# Patient Record
Sex: FEMALE | Race: WHITE | NOT HISPANIC OR LATINO | Employment: FULL TIME | URBAN - METROPOLITAN AREA
[De-identification: names, ages, dates, MRNs, and addresses within clinical notes are randomized per-mention and may not be internally consistent; named-entity substitution may affect disease eponyms.]

---

## 2020-12-15 PROCEDURE — U0004 COV-19 TEST NON-CDC HGH THRU: HCPCS | Performed by: PHYSICIAN ASSISTANT

## 2021-07-16 ENCOUNTER — HOSPITAL ENCOUNTER (OUTPATIENT)
Dept: GENERAL RADIOLOGY | Facility: HOSPITAL | Age: 41
Discharge: HOME | End: 2021-07-16
Admitting: STUDENT IN AN ORGANIZED HEALTH CARE EDUCATION/TRAINING PROGRAM

## 2021-07-16 ENCOUNTER — TRANSCRIBE ORDERS (OUTPATIENT)
Dept: ADMINISTRATIVE | Facility: HOSPITAL | Age: 41
End: 2021-07-16

## 2021-07-16 DIAGNOSIS — M54.2 NECK PAIN ON LEFT SIDE: ICD-10-CM

## 2021-07-16 DIAGNOSIS — S86.912D KNEE STRAIN, LEFT, SUBSEQUENT ENCOUNTER: Primary | ICD-10-CM

## 2021-07-16 PROCEDURE — 72052 X-RAY EXAM NECK SPINE 6/>VWS: CPT

## 2021-07-16 PROCEDURE — 73560 X-RAY EXAM OF KNEE 1 OR 2: CPT

## 2021-10-06 ENCOUNTER — TRANSCRIBE ORDERS (OUTPATIENT)
Dept: ADMINISTRATIVE | Facility: HOSPITAL | Age: 41
End: 2021-10-06

## 2021-10-06 DIAGNOSIS — M25.562 CHRONIC PAIN OF LEFT KNEE: Primary | ICD-10-CM

## 2021-10-06 DIAGNOSIS — G89.29 CHRONIC PAIN OF LEFT KNEE: Primary | ICD-10-CM

## 2021-11-05 ENCOUNTER — HOSPITAL ENCOUNTER (OUTPATIENT)
Dept: MRI IMAGING | Facility: HOSPITAL | Age: 41
Discharge: HOME | End: 2021-11-05
Admitting: STUDENT IN AN ORGANIZED HEALTH CARE EDUCATION/TRAINING PROGRAM

## 2021-11-05 DIAGNOSIS — G89.29 CHRONIC PAIN OF LEFT KNEE: ICD-10-CM

## 2021-11-05 DIAGNOSIS — M25.562 CHRONIC PAIN OF LEFT KNEE: ICD-10-CM

## 2021-11-05 PROCEDURE — 73721 MRI JNT OF LWR EXTRE W/O DYE: CPT

## 2021-11-24 ENCOUNTER — OFFICE VISIT (OUTPATIENT)
Dept: ORTHOPEDIC SURGERY | Facility: CLINIC | Age: 41
End: 2021-11-24

## 2021-11-24 VITALS
SYSTOLIC BLOOD PRESSURE: 129 MMHG | WEIGHT: 248.2 LBS | BODY MASS INDEX: 35.53 KG/M2 | HEIGHT: 70 IN | DIASTOLIC BLOOD PRESSURE: 82 MMHG

## 2021-11-24 DIAGNOSIS — G89.29 CHRONIC PAIN OF LEFT KNEE: ICD-10-CM

## 2021-11-24 DIAGNOSIS — M94.262 CHONDROMALACIA OF LEFT KNEE: Primary | ICD-10-CM

## 2021-11-24 DIAGNOSIS — M25.562 CHRONIC PAIN OF LEFT KNEE: ICD-10-CM

## 2021-11-24 PROCEDURE — 99204 OFFICE O/P NEW MOD 45 MIN: CPT | Performed by: ORTHOPAEDIC SURGERY

## 2021-11-24 PROCEDURE — 20610 DRAIN/INJ JOINT/BURSA W/O US: CPT | Performed by: ORTHOPAEDIC SURGERY

## 2021-11-24 RX ORDER — ROPIVACAINE HYDROCHLORIDE 5 MG/ML
4 INJECTION, SOLUTION EPIDURAL; INFILTRATION; PERINEURAL
Status: COMPLETED | OUTPATIENT
Start: 2021-11-24 | End: 2021-11-24

## 2021-11-24 RX ORDER — OXCARBAZEPINE 150 MG/1
TABLET, FILM COATED ORAL
COMMUNITY
Start: 2021-11-04 | End: 2022-04-01

## 2021-11-24 RX ORDER — MULTIPLE VITAMINS W/ MINERALS TAB 9MG-400MCG
TAB ORAL
COMMUNITY

## 2021-11-24 RX ORDER — TRIAMCINOLONE ACETONIDE 40 MG/ML
40 INJECTION, SUSPENSION INTRA-ARTICULAR; INTRAMUSCULAR
Status: COMPLETED | OUTPATIENT
Start: 2021-11-24 | End: 2021-11-24

## 2021-11-24 RX ORDER — BACLOFEN 10 MG/1
10 TABLET ORAL NIGHTLY PRN
COMMUNITY

## 2021-11-24 RX ORDER — PREGABALIN 200 MG/1
200 CAPSULE ORAL 3 TIMES DAILY
COMMUNITY
Start: 2021-11-17 | End: 2022-09-06 | Stop reason: SDUPTHER

## 2021-11-24 RX ORDER — ACETAMINOPHEN 500 MG
TABLET ORAL
COMMUNITY
End: 2022-04-01

## 2021-11-24 RX ORDER — AZELASTINE 1 MG/ML
2 SPRAY, METERED NASAL
COMMUNITY
End: 2022-08-12

## 2021-11-24 RX ADMIN — ROPIVACAINE HYDROCHLORIDE 4 ML: 5 INJECTION, SOLUTION EPIDURAL; INFILTRATION; PERINEURAL at 11:02

## 2021-11-24 RX ADMIN — TRIAMCINOLONE ACETONIDE 40 MG: 40 INJECTION, SUSPENSION INTRA-ARTICULAR; INTRAMUSCULAR at 11:02

## 2021-11-24 NOTE — PROGRESS NOTES
[facility] [place] Note    Subjective     Chief Complaint   Patient presents with   • Left Knee - Pain        HPI    Bev Ferrari is a 41 y.o. female who presents with new problem of: left knee pain.  Onset: atraumatic and gradual in nature. The issue has been ongoing for 4 year(s). Pain is a 6/10 on the pain scale. Pain is described as dull, aching, burning, throbbing, stabbing and shooting. Associated symptoms include pain. The pain is worse with walking, standing, sitting, climbing stairs, working and leisure; ice and heat improve the pain. Previous treatments have included: NSAIDS and physical therapy.  No previous injections.  No improvement with physical therapy.  Pain is on the medial aspect of the knee.  No history of trauma.    I have reviewed the following portions of the patient's history and agree with: History of Present Illness and Review of Systems    There is no problem list on file for this patient.    Past Medical History:   Diagnosis Date   • HPV in female       Past Surgical History:   Procedure Laterality Date   • DENTAL PROCEDURE        Family History   Problem Relation Age of Onset   • Cancer Mother    • Diabetes Father    • Hypertension Father    • Cancer Father      Social History     Socioeconomic History   • Marital status: Single   Tobacco Use   • Smoking status: Never Smoker   • Smokeless tobacco: Never Used   Vaping Use   • Vaping Use: Never used   Substance and Sexual Activity   • Alcohol use: Yes   • Drug use: Never   • Sexual activity: Yes      Current Outpatient Medications on File Prior to Visit   Medication Sig Dispense Refill   • acetaminophen (TYLENOL) 500 MG tablet Tylenol Extra Strength 500 mg tablet     • azelastine (ASTELIN) 0.1 % nasal spray azelastine 137 mcg (0.1 %) nasal spray aerosol     • baclofen (LIORESAL) 10 MG tablet baclofen 10 mg tablet     • loratadine (CLARITIN) 10 MG tablet Take 10 mg by mouth Daily.     • multivitamin with minerals  "(Multivitamin Adult) tablet tablet      • OXcarbazepine (TRILEPTAL) 150 MG tablet Take  by mouth.     • pregabalin (LYRICA) 200 MG capsule        No current facility-administered medications on file prior to visit.      Allergies   Allergen Reactions   • Azithromycin Itching   • Molds & Smuts Other (See Comments)   • Pollen Extract Other (See Comments)   • Vicodin [Hydrocodone-Acetaminophen] Itching        Review of Systems   Constitutional: Negative.    HENT: Negative.    Eyes: Negative.    Respiratory: Negative.    Cardiovascular: Negative.    Gastrointestinal: Negative.    Endocrine: Negative.    Genitourinary: Negative.    Musculoskeletal: Positive for arthralgias.   Skin: Negative.    Allergic/Immunologic: Negative.    Neurological: Negative.    Hematological: Negative.    Psychiatric/Behavioral: Negative.         Objective      Physical Exam  /82   Ht 177.8 cm (70\")   Wt 113 kg (248 lb 3.2 oz)   BMI 35.61 kg/m²     Body mass index is 35.61 kg/m².    General:   Mental Status:  Alert   Appearance: Cooperative, in no acute distress   Build and Nutrition: Obese by BMI female   Orientation: Alert and oriented to person, place and time   Posture: Normal   Gait: Nonantalgic    Integument:   Left knee: No skin lesions, no rash, no ecchymosis    Neurologic:   Sensation:    Left foot: Intact to light touch on the dorsal and plantar aspect   Motor:  Left lower extremity: 5/5 quadriceps, hamstrings, ankle dorsiflexors, and ankle plantar flexors  Vascular:   Left lower extremity: 2+ dorsalis pedis pulse, prompt capillary refill    Lower Extremities:   Left Knee:    Tenderness:  Mild medial joint line tenderness    Effusion:  None    Swelling:  None    Atrophy:  None    Range of motion:  Extension: 0°       Flexion: 140°  Instability:  No varus laxity, no valgus laxity, negative anterior drawer  Deformities:  None      Imaging/Studies    EXAMINATION: XR STANDING LEFT KNEE  - 07/16/2021     INDICATION: " S86.912D-Strain of unspecified muscle(s) and tendon(s) at  lower leg level, left leg, subsequent encounter.      COMPARISON: None.     FINDINGS: AP and lateral views of the left knee standing reveal mild  degenerative changes and mild joint space narrowing in the medial  compartment without acute fracture or irregularity with tibial plateau  preserved. Fabella noted. Benign-appearing chondroid lesion within the  distal femur. No effusion.     IMPRESSION:  No acute osseous findings with mild degenerative changes and  mild medial joint space narrowing without effusion.     DICTATED:   07/16/2021  EDITED/ls :   07/16/2021     This report was finalized on 7/16/2021 5:17 PM by Dr. Luca Goyal.           EXAMINATION: MRI KNEE LEFT  WO CONTRAST-      INDICATION: CHRONIC PAIN OF LEFT KNEE; M25.562-Pain in left knee;  G89.29-Other chronic pain     TECHNIQUE: MRI of the left knee was obtained using standard imaging  sequences in three orthogonal imaging planes. No intravenous or  intra-articular contrast was administered.     COMPARISON: Left knee radiographs 7/16/2021     FINDINGS:      A 2.3 x 1.4 cm intramedullary ovoid T1 hypointense, T2 hyperintense  lesion with stippled central hypointensities of the distal femoral shaft  corresponds to radiographic finding of ring and arc calcifications,  compatible with low-grade chondroid lesion, likely enchondroma. No  radiographic or MR evidence of endosteal scalloping or other aggressive  features. Otherwise the bone marrow signal intensity is within normal  limits without evidence of discrete lesion, fracture, or marrow edema.     The medial meniscus is normal. The medial supporting structures are  normal. The articular cartilage of the medial femorotibial compartment  is grossly preserved.     The lateral meniscus is normal. The lateral supporting structures are  normal. The articular cartilage of the lateral femorotibial compartment  is grossly preserved.     The anterior  cruciate ligament and posterior cruciate ligament are  normal.     The quadriceps tendon and patellar tendon are normal. High-grade near  full-thickness chondral fissure of the median patellar ridge (series 4  image 7). The trochlear cartilage is grossly preserved.     There is a physiologic amount of joint fluid. The posterior knee  neurovasculature is unremarkable. No popliteal cyst. No subcutaneous  edema. Normal morphology and signal intensity of the musculature.        IMPRESSION:     High-grade near full-thickness chondral fissure of the medial aspect of  the median patellar ridge.     Incidental note of intramedullary low-grade chondral lesion in the  distal femoral shaft, likely enchondroma. No endosteal scalloping or  other aggressive features.     This report was finalized on 11/5/2021 4:10 PM by Augusto Montoya MD.     I reviewed the MRI report and imaging and agree with the findings.  Also reviewed the outside radiographic imaging, and agree with the findings.    Assessment and Plan     Diagnoses and all orders for this visit:    1. Chondromalacia of left knee (Primary)    2. Chronic pain of left knee  -     Large Joint Arthrocentesis: L knee  -     ropivacaine (NAROPIN) 0.5 % injection 4 mL  -     triamcinolone acetonide (KENALOG-40) injection 40 mg        1. Chondromalacia of left knee    2. Chronic pain of left knee        I reviewed my findings with the patient today.  MRI shows a chondral fissure, and patient has had ongoing symptoms with no improvement with physical therapy.  I offered her a trial of an intra-articular injection, which should be done in a limited fashion, and she wishes to proceed.  I will see her back in 2 months, but sooner for any problems.  I do not believe that surgical intervention would be particularly helpful at this time.    Procedure Note:  The potential benefits of performing a therapeutic left knee joint injection, as well as potential risks (including, but not limited  to infection, swelling, pain, bleeding, bruising, nerve/blood vessel damage, skin color changes, transient elevation in blood glucose levels, and fat atrophy) were discussed with the patient.  After informed consent, timeout procedure was performed, and the skin on the left knee was prepped with chlorhexidine soap and alcohol, after which ethyl chloride was applied to the skin at the injection site. Via the anterolateral approach, 1ml of Kenalog 40mg/ml mixed with 4ml 0.5% ropivacaine plain was injected into the knee joint.  The patient tolerated the procedure well, experiencing 25% improvement a few minutes following the injection. There were no complications.  Band-Aid was applied to the injection site. Post-procedural instructions were given to the patient and/or their caregiver.      Return in about 2 months (around 1/24/2022).      Miguel Werner MD  11/24/21  11:28 EST

## 2021-11-24 NOTE — PROGRESS NOTES
Procedure   Large Joint Arthrocentesis: L knee  Date/Time: 11/24/2021 11:02 AM  Consent given by: patient  Site marked: site marked  Timeout: Immediately prior to procedure a time out was called to verify the correct patient, procedure, equipment, support staff and site/side marked as required   Supporting Documentation  Indications: pain   Procedure Details  Location: knee - L knee  Preparation: Patient was prepped and draped in the usual sterile fashion  Needle size: 22 G  Approach: anterolateral  Medications administered: 4 mL ropivacaine 0.5 %; 40 mg triamcinolone acetonide 40 MG/ML  Patient tolerance: patient tolerated the procedure well with no immediate complications

## 2022-02-28 ENCOUNTER — OFFICE VISIT (OUTPATIENT)
Dept: ORTHOPEDIC SURGERY | Facility: CLINIC | Age: 42
End: 2022-02-28

## 2022-02-28 VITALS
DIASTOLIC BLOOD PRESSURE: 88 MMHG | WEIGHT: 246 LBS | HEIGHT: 70 IN | BODY MASS INDEX: 35.22 KG/M2 | SYSTOLIC BLOOD PRESSURE: 132 MMHG

## 2022-02-28 DIAGNOSIS — G89.29 CHRONIC PAIN OF LEFT KNEE: Primary | ICD-10-CM

## 2022-02-28 DIAGNOSIS — M25.562 CHRONIC PAIN OF LEFT KNEE: Primary | ICD-10-CM

## 2022-02-28 PROCEDURE — 99213 OFFICE O/P EST LOW 20 MIN: CPT | Performed by: ORTHOPAEDIC SURGERY

## 2022-02-28 RX ORDER — OXYMETAZOLINE HYDROCHLORIDE 0.05 G/100ML
SPRAY NASAL
COMMUNITY
End: 2022-04-01

## 2022-02-28 RX ORDER — TRAMADOL HYDROCHLORIDE 50 MG/1
TABLET ORAL
COMMUNITY
Start: 2022-01-25 | End: 2022-04-01

## 2022-02-28 NOTE — PROGRESS NOTES
[facility] [place] [facility] Note    Subjective     Chief Complaint   Patient presents with   • Follow-up     3 month f/u Chondromalacia of left knee; cortisone injection 11.24.2021        HPI    It has been 3  month(s) since Ms. Ferrari's last visit. She returns to clinic today for follow-up of left knee pain. The issue has been ongoing for 4 year(s). She rates her pain a 7/10 on the pain scale. Previous/current treatments: NSAIDS and physical therapy. Current symptoms: pain, grinding and stiffness. The pain is worse with walking, standing, sitting, climbing stairs, sleeping, working, leisure and rising from seated position; resting, ice and heat improve the pain. Overall, she is doing the same. Pain is on the medial aspect of the knee. Pain to palpation on the medial aspect of her knee. No significant improvement with the injection on her last visit. She does have a known history of back issues.    I have reviewed the following portions of the patient's history and agree with: History of Present Illness and Review of Systems    There is no problem list on file for this patient.    Past Medical History:   Diagnosis Date   • HPV in female       Past Surgical History:   Procedure Laterality Date   • DENTAL PROCEDURE        Family History   Problem Relation Age of Onset   • Cancer Mother    • Diabetes Father    • Hypertension Father    • Cancer Father      Social History     Socioeconomic History   • Marital status: Single   Tobacco Use   • Smoking status: Never Smoker   • Smokeless tobacco: Never Used   Vaping Use   • Vaping Use: Never used   Substance and Sexual Activity   • Alcohol use: Yes   • Drug use: Never   • Sexual activity: Yes      Current Outpatient Medications on File Prior to Visit   Medication Sig Dispense Refill   • acetaminophen (TYLENOL) 500 MG tablet Tylenol Extra Strength 500 mg tablet     • azelastine (ASTELIN) 0.1 % nasal spray azelastine 137 mcg (0.1 %) nasal spray aerosol     • baclofen  (LIORESAL) 10 MG tablet baclofen 10 mg tablet     • loratadine (CLARITIN) 10 MG tablet Take 10 mg by mouth Daily.     • multivitamin with minerals (Multivitamin Adult) tablet tablet      • Oxymetazoline HCl (Nasal Spray) 0.05 % solution oxymetazoline 0.05 % nasal spray   use 1-2 sprays in each nostril as directed     • pregabalin (LYRICA) 200 MG capsule      • traMADol (ULTRAM) 50 MG tablet      • OXcarbazepine (TRILEPTAL) 150 MG tablet Take  by mouth.       No current facility-administered medications on file prior to visit.      Allergies   Allergen Reactions   • Azithromycin Itching   • Molds & Smuts Other (See Comments)   • Pollen Extract Other (See Comments)   • Vicodin [Hydrocodone-Acetaminophen] Itching        Review of Systems   Constitutional: Positive for unexpected weight change. Negative for activity change, appetite change, chills, diaphoresis, fatigue and fever.   HENT: Positive for congestion, dental problem, ear pain, hearing loss, sinus pressure and tinnitus. Negative for drooling, ear discharge, facial swelling, mouth sores, nosebleeds, postnasal drip, rhinorrhea, sneezing, sore throat, trouble swallowing and voice change.    Eyes: Negative.  Negative for photophobia, pain, discharge, redness, itching and visual disturbance.   Respiratory: Negative.  Negative for apnea, cough, choking, chest tightness, shortness of breath, wheezing and stridor.    Cardiovascular: Negative.  Negative for chest pain, palpitations and leg swelling.   Gastrointestinal: Positive for abdominal pain and constipation. Negative for abdominal distention, anal bleeding, blood in stool, diarrhea, nausea, rectal pain and vomiting.   Endocrine: Negative.  Negative for cold intolerance, heat intolerance, polydipsia, polyphagia and polyuria.   Genitourinary: Positive for difficulty urinating. Negative for decreased urine volume, dysuria, enuresis, flank pain, frequency, genital sores, hematuria and urgency.   Musculoskeletal:  "Positive for arthralgias, back pain, neck pain and neck stiffness. Negative for gait problem, joint swelling and myalgias.   Skin: Negative.  Negative for color change, pallor, rash and wound.   Allergic/Immunologic: Positive for environmental allergies. Negative for food allergies and immunocompromised state.   Neurological: Positive for headaches. Negative for dizziness, tremors, seizures, syncope, facial asymmetry, speech difficulty, weakness, light-headedness and numbness.   Hematological: Negative.  Negative for adenopathy. Does not bruise/bleed easily.   Psychiatric/Behavioral: Negative for agitation, behavioral problems, confusion, decreased concentration, dysphoric mood, hallucinations, self-injury, sleep disturbance and suicidal ideas. The patient is nervous/anxious. The patient is not hyperactive.         Objective      Physical Exam  /88   Ht 177.8 cm (70\")   Wt 112 kg (246 lb)   BMI 35.30 kg/m²     Body mass index is 35.3 kg/m².    General:   Mental Status:  Alert   Appearance: Cooperative, in no acute distress   Build and Nutrition: Obese by BMI female   Orientation: Alert and oriented to person, place and time   Posture: Normal   Gait: Nonantalgic    Integument:              Left knee: No skin lesions, no rash, no ecchymosis     Lower Extremities:              Left Knee:                          Tenderness:    Mild medial joint line tenderness                          Effusion:          None                          Swelling:          None                          Atrophy:           None                          Range of motion:        Extension:       0°                                                              Flexion:           140°  Instability:        No varus laxity, no valgus laxity, negative anterior drawer  Deformities:     None    Imaging/Studies  Imaging Results (Last 24 Hours)     ** No results found for the last 24 hours. **        No new imaging    Assessment and Plan "     Diagnoses and all orders for this visit:    1. Chronic pain of left knee (Primary)        1. Chronic pain of left knee        I reviewed my findings again with the patient today. Previous studies were reviewed again today, and there is no clear etiology for her medial pain. She had no significant relief with the intra-articular injection on her last visit. I recommended a trial of diclofenac gel for the next 2 to 3 weeks in hopes of providing some relief from the medial pain. If there is no relief, she may want to seek a second opinion and we can assist with that referral if she desires. In summary, she has had no improvement with oral anti-inflammatories, physical therapy, activity modifications, and an intra-articular knee injection. Patient was agreeable to this plan today. I'll be happy to see her back if she has any worsening symptoms or changes, or persistence after the diclofenac gel trial.    Return if symptoms worsen or fail to improve.      Miguel Werner MD  02/28/22  17:27 EST

## 2022-02-28 NOTE — PROGRESS NOTES
"    [facility] [place] [facility]    Subjective     Chief Complaint   Patient presents with   • Follow-up     3 month f/u Chondromalacia of left knee; cortisone injection 11.24.2021        HPI    It has been {Numbers; 0-30:96864}  {DAYS, WEEKS, MONTHS, YEARS:22024} since Ms. Ferrari's last visit. She returns to clinic today for {Jamir new/established:80244} {Right/left:46374} {Jamir body part:98502} {Jamir Reason:11523}. The issue has been ongoing for {Numbers; 0-30:36212} {DAYS, WEEKS, MONTHS, YEARS:64608}. She rates her pain a {0-10:18129}/10 on the pain scale. Previous/current treatments: {Previous Tx:36037}. Current symptoms: {Jamir Symptoms:42264}. The pain is worse with {Movement:04445}; {Home Tx:28868} improve the pain. Overall, she is doing {better worse same:01181}. ***     I have reviewed the following portions of the patient's history and agree with: {Alphonso HPI and ROS:62715::\"History of Present Illness\",\"Review of Systems\"}    There is no problem list on file for this patient.    Past Medical History:   Diagnosis Date   • HPV in female       Past Surgical History:   Procedure Laterality Date   • DENTAL PROCEDURE        Family History   Problem Relation Age of Onset   • Cancer Mother    • Diabetes Father    • Hypertension Father    • Cancer Father      Social History     Socioeconomic History   • Marital status: Single   Tobacco Use   • Smoking status: Never Smoker   • Smokeless tobacco: Never Used   Vaping Use   • Vaping Use: Never used   Substance and Sexual Activity   • Alcohol use: Yes   • Drug use: Never   • Sexual activity: Yes      Current Outpatient Medications on File Prior to Visit   Medication Sig Dispense Refill   • acetaminophen (TYLENOL) 500 MG tablet Tylenol Extra Strength 500 mg tablet     • azelastine (ASTELIN) 0.1 % nasal spray azelastine 137 mcg (0.1 %) nasal spray aerosol     • baclofen (LIORESAL) 10 MG tablet baclofen 10 mg tablet     • loratadine (CLARITIN) 10 MG tablet " Take 10 mg by mouth Daily.     • multivitamin with minerals (Multivitamin Adult) tablet tablet      • OXcarbazepine (TRILEPTAL) 150 MG tablet Take  by mouth.     • pregabalin (LYRICA) 200 MG capsule        No current facility-administered medications on file prior to visit.      Allergies   Allergen Reactions   • Azithromycin Itching   • Molds & Smuts Other (See Comments)   • Pollen Extract Other (See Comments)   • Vicodin [Hydrocodone-Acetaminophen] Itching        Review of Systems   Constitutional: Negative for activity change, appetite change, chills, diaphoresis, fatigue, fever and unexpected weight change.   HENT: Negative for congestion, dental problem, drooling, ear discharge, ear pain, facial swelling, hearing loss, mouth sores, nosebleeds, postnasal drip, rhinorrhea, sinus pressure, sneezing, sore throat, tinnitus, trouble swallowing and voice change.    Eyes: Negative for photophobia, pain, discharge, redness, itching and visual disturbance.   Respiratory: Negative for apnea, cough, choking, chest tightness, shortness of breath, wheezing and stridor.    Cardiovascular: Negative for chest pain, palpitations and leg swelling.   Gastrointestinal: Negative for abdominal distention, abdominal pain, anal bleeding, blood in stool, constipation, diarrhea, nausea, rectal pain and vomiting.   Endocrine: Negative for cold intolerance, heat intolerance, polydipsia, polyphagia and polyuria.   Genitourinary: Negative for decreased urine volume, difficulty urinating, dysuria, enuresis, flank pain, frequency, genital sores, hematuria and urgency.   Musculoskeletal: Positive for arthralgias. Negative for back pain, gait problem, joint swelling, myalgias, neck pain and neck stiffness.   Skin: Negative for color change, pallor, rash and wound.   Allergic/Immunologic: Negative for environmental allergies, food allergies and immunocompromised state.   Neurological: Negative for dizziness, tremors, seizures, syncope, facial  "asymmetry, speech difficulty, weakness, light-headedness, numbness and headaches.   Hematological: Negative for adenopathy. Does not bruise/bleed easily.   Psychiatric/Behavioral: Negative for agitation, behavioral problems, confusion, decreased concentration, dysphoric mood, hallucinations, self-injury, sleep disturbance and suicidal ideas. The patient is not nervous/anxious and is not hyperactive.         Objective      Physical Exam  Ht 177.8 cm (70\")   BMI 35.61 kg/m²     Body mass index is 35.61 kg/m².    General:   Mental Status:  Alert   Appearance: Cooperative, in no acute distress   Build and Nutrition: *** female   Orientation: Alert and oriented to person, place and time   Posture: Normal   Gait: ***    ***    Imaging/Studies  Imaging Results (Last 24 Hours)     ** No results found for the last 24 hours. **            Assessment and Plan     There are no diagnoses linked to this encounter.    No diagnosis found.    ***    No follow-ups on file.      Miguel Werner MD  02/28/22  16:22 EST    "

## 2022-04-01 ENCOUNTER — OFFICE VISIT (OUTPATIENT)
Dept: NEUROLOGY | Facility: CLINIC | Age: 42
End: 2022-04-01

## 2022-04-01 VITALS
HEIGHT: 70 IN | HEART RATE: 88 BPM | SYSTOLIC BLOOD PRESSURE: 110 MMHG | TEMPERATURE: 97.3 F | OXYGEN SATURATION: 97 % | WEIGHT: 252.6 LBS | DIASTOLIC BLOOD PRESSURE: 88 MMHG | RESPIRATION RATE: 14 BRPM | BODY MASS INDEX: 36.16 KG/M2

## 2022-04-01 DIAGNOSIS — G50.0 TRIGEMINAL NEURALGIA: Primary | ICD-10-CM

## 2022-04-01 DIAGNOSIS — M35.07 SJOGREN'S SYNDROME WITH CENTRAL NERVOUS SYSTEM INVOLVEMENT: ICD-10-CM

## 2022-04-01 PROCEDURE — 99204 OFFICE O/P NEW MOD 45 MIN: CPT | Performed by: PSYCHIATRY & NEUROLOGY

## 2022-04-01 RX ORDER — OMEPRAZOLE 20 MG/1
20 CAPSULE, DELAYED RELEASE ORAL DAILY
COMMUNITY
End: 2022-08-12

## 2022-04-01 NOTE — PROGRESS NOTES
Chief Complaint    Establish Care (Trigeminal neuralgia ) and Trigeminal Neuralgia    Subjective          Bev Ferrari presents to [facility] NEUROLOGY     History of Present Illness    41 y.o. female referred by Dr Lan Obregon for trigeminal neuralgia.    Pain started over left ear last March 2021.  Constant.  Intensity 6/10.      Progressed to sharp electrical shocks into face.  Touch and cold provoked shocks.   Tx with muscle relaxers and steroids w/o benefit.     Left face sensitive to touch.  Cold and weather changes increases discomfort.      Lyrica decreases pain by a moderate amount.      OXC reduced pain but had increased bruising.     Started on right side but is minor.      Reviewed medical records:    Sx of left facial pain and weakness.  Sx onset months ago.  Sx occur weekly.  Concurrent Sjogren's.    Recent onset of right sided facial pain.      Meds:  Tramadol, Lyrica, baclofen, TPM, OXC, CBZ, TCAD     Labs - 1/4/22 CBC, CMP - NCS     HCT - normal     MRI Brain - NCS    Past Medical History:   Diagnosis Date   • Anxiety    • Arthritis    • Bowel trouble    • Chronic mental illness    • Depression    • H/O seasonal allergies    • Headache    • Hearing loss    • HPV in female    • Sjogren's syndrome (HCC)    • Trigeminal neuralgia       Family History   Problem Relation Age of Onset   • Dementia Mother    • Cancer Mother    • Skin cancer Mother    • Bipolar disorder Mother    • Diabetes Father    • Hypertension Father    • Cancer Father    • Prostate cancer Father    • Heart disease Father    • High cholesterol Father    • Obesity Father    • Depression Sister    • Multiple sclerosis Sister    • Epilepsy Brother    • Breast cancer Maternal Grandmother    • Alcohol abuse Maternal Grandfather       Social History     Socioeconomic History   • Marital status: Single   Tobacco Use   • Smoking status: Never Smoker   • Smokeless tobacco: Never Used   Vaping Use   • Vaping Use: Never  "used   Substance and Sexual Activity   • Alcohol use: Yes   • Drug use: Never   • Sexual activity: Yes    Review of Systems   Constitutional: Negative for activity change, fatigue and unexpected weight change.   HENT: Negative for tinnitus and trouble swallowing.    Eyes: Negative for photophobia and visual disturbance.   Respiratory: Negative for apnea, cough and choking.    Cardiovascular: Negative for leg swelling.   Gastrointestinal: Negative for nausea and vomiting.   Endocrine: Negative for cold intolerance and heat intolerance.   Genitourinary: Negative for difficulty urinating, frequency, menstrual problem and urgency.   Musculoskeletal: Positive for arthralgias. Negative for back pain, gait problem, myalgias and neck pain.   Skin: Negative for color change and rash.   Allergic/Immunologic: Negative for immunocompromised state.   Neurological: Positive for headaches. Negative for dizziness, tremors, seizures, syncope, facial asymmetry, speech difficulty, weakness, light-headedness and numbness.   Hematological: Negative for adenopathy. Does not bruise/bleed easily.   Psychiatric/Behavioral: Negative for behavioral problems, confusion, decreased concentration, hallucinations and sleep disturbance. The patient is nervous/anxious.          Objective   Vital Signs:   /88 (BP Location: Right arm, Patient Position: Sitting, Cuff Size: Adult)   Pulse 88   Temp 97.3 °F (36.3 °C) (Infrared)   Resp 14   Ht 177.8 cm (70\")   Wt 115 kg (252 lb 9.6 oz)   SpO2 97%   BMI 36.24 kg/m²            Physical Exam  Eyes:      Extraocular Movements: EOM normal.      Pupils: Pupils are equal, round, and reactive to light.   Neurological:      Mental Status: She is oriented to person, place, and time.      Gait: Gait is intact.      Deep Tendon Reflexes: Strength normal.   Psychiatric:         Speech: Speech normal.          Neurologic Exam     Mental Status   Oriented to person, place, and time.   Speech: speech is " normal   Level of consciousness: alert  Knowledge: good and consistent with education.   Normal comprehension.     Cranial Nerves   Cranial nerves II through XII intact.     CN II   Visual fields full to confrontation.   Visual acuity: normal  Right visual field deficit: none  Left visual field deficit: none     CN III, IV, VI   Pupils are equal, round, and reactive to light.  Extraocular motions are normal.   Nystagmus: none   Diplopia: none  Ophthalmoparesis: none  Upgaze: normal  Downgaze: normal  Conjugate gaze: present    CN V   Facial sensation intact.   Right corneal reflex: normal  Left corneal reflex: normal    CN VII   Right facial weakness: none  Left facial weakness: none    CN VIII   Hearing: intact    CN IX, X   Palate: symmetric  Right gag reflex: normal  Left gag reflex: normal    CN XI   Right sternocleidomastoid strength: normal  Left sternocleidomastoid strength: normal    CN XII   Tongue: not atrophic  Fasciculations: absent  Tongue deviation: none  Increased sensitivity L V2      Motor Exam   Muscle bulk: normal  Overall muscle tone: normal    Strength   Strength 5/5 throughout.     Sensory Exam   Light touch normal.     Gait, Coordination, and Reflexes     Gait  Gait: normal    Tremor   Resting tremor: absent  Intention tremor: absent  Action tremor: absent    Reflexes   Reflexes 2+ except as noted.      Result Review :     Common labs    Common Labsle 1/4/22 1/4/22    1340 1340   Glucose 97    BUN 15    Creatinine 0.83    eGFR Non  Am >60    eGFR African Am >60    Sodium 142    Potassium 4.4    Chloride 105    Calcium 9.8    Albumin 4.4    Total Bilirubin 0.3    Alkaline Phosphatase 89    AST (SGOT) 15    ALT (SGPT) 17    WBC  10.63 (A)   Hemoglobin  14.0   Hematocrit  44.1   Platelets  330   (A) Abnormal value       Comments are available for some flowsheets but are not being displayed.           Data reviewed: Consultant notes Dr Kitchen, [facility]          Assessment and Plan     Diagnoses and all orders for this visit:    1. Trigeminal neuralgia (Primary)  Assessment & Plan:  Continue Lyrica     Recommend restarting OXC       2. Sjogren's syndrome with central nervous system involvement (HCC)      Follow Up   Return in about 8 weeks (around 5/27/2022).  Patient was given instructions and counseling regarding her condition or for health maintenance advice. Please see specific information pulled into the AVS if appropriate.

## 2022-04-14 VITALS
RESPIRATION RATE: 18 BRPM | BODY MASS INDEX: 36.08 KG/M2 | SYSTOLIC BLOOD PRESSURE: 158 MMHG | WEIGHT: 252 LBS | OXYGEN SATURATION: 99 % | TEMPERATURE: 97.8 F | HEART RATE: 95 BPM | DIASTOLIC BLOOD PRESSURE: 83 MMHG | HEIGHT: 70 IN

## 2022-04-14 PROCEDURE — 99282 EMERGENCY DEPT VISIT SF MDM: CPT

## 2022-04-15 ENCOUNTER — APPOINTMENT (OUTPATIENT)
Dept: GENERAL RADIOLOGY | Facility: HOSPITAL | Age: 42
End: 2022-04-15

## 2022-04-15 ENCOUNTER — APPOINTMENT (OUTPATIENT)
Dept: CT IMAGING | Facility: HOSPITAL | Age: 42
End: 2022-04-15

## 2022-04-15 ENCOUNTER — HOSPITAL ENCOUNTER (EMERGENCY)
Facility: HOSPITAL | Age: 42
Discharge: HOME | End: 2022-04-15
Attending: EMERGENCY MEDICINE | Admitting: EMERGENCY MEDICINE

## 2022-04-15 DIAGNOSIS — K22.89 ESOPHAGEAL PAIN: Primary | ICD-10-CM

## 2022-04-15 PROCEDURE — 71045 X-RAY EXAM CHEST 1 VIEW: CPT

## 2022-04-15 PROCEDURE — 70360 X-RAY EXAM OF NECK: CPT

## 2022-04-15 PROCEDURE — 70490 CT SOFT TISSUE NECK W/O DYE: CPT

## 2022-04-15 RX ORDER — OXCARBAZEPINE 150 MG/1
150 TABLET, FILM COATED ORAL 2 TIMES DAILY
COMMUNITY
End: 2022-04-29 | Stop reason: SDUPTHER

## 2022-04-20 ENCOUNTER — TELEPHONE (OUTPATIENT)
Dept: NEUROLOGY | Facility: CLINIC | Age: 42
End: 2022-04-20

## 2022-04-20 NOTE — TELEPHONE ENCOUNTER
Notified Bev of 's recommendation:   Adjust  mg qam 150 mg qpm and 450 gm qhs     She states understanding and will see how she tolerates this, keep us updated if she has any issues. Thanks!

## 2022-04-29 RX ORDER — OXCARBAZEPINE 150 MG/1
150 TABLET, FILM COATED ORAL 2 TIMES DAILY
Qty: 60 TABLET | Refills: 2 | Status: SHIPPED | OUTPATIENT
Start: 2022-04-29 | End: 2022-05-02 | Stop reason: SDUPTHER

## 2022-05-02 ENCOUNTER — TELEPHONE (OUTPATIENT)
Dept: NEUROLOGY | Facility: CLINIC | Age: 42
End: 2022-05-02

## 2022-05-02 DIAGNOSIS — G50.0 TRIGEMINAL NEURALGIA: Primary | ICD-10-CM

## 2022-05-02 RX ORDER — OXCARBAZEPINE 150 MG/1
TABLET, FILM COATED ORAL
Qty: 210 TABLET | Refills: 2 | Status: SHIPPED | OUTPATIENT
Start: 2022-05-02 | End: 2022-08-05 | Stop reason: SDUPTHER

## 2022-05-02 NOTE — TELEPHONE ENCOUNTER
Called pharmacy to make sure increase will go through. Pharmacist states as of May 6th it will so she can finish off the 150's she picked up Friday go ahead and do the increase. Thanks!      Notified patient who was already aware of this and the increase thankfully so has been taking them this way and will  the correct amount that has been sent in this Friday.

## 2022-05-02 NOTE — TELEPHONE ENCOUNTER
PT CALLED FOR RX , WE CALLED IN WRONG DOSAGE CALLED OFFICE TALK TO BETH , TRANS CALL REQUIRES HER ATTENTION

## 2022-05-02 NOTE — TELEPHONE ENCOUNTER
Bev called as she states the office sent in the same amount on Friday when she needed the increase. Per Jose's 4/20/22 telephone encounter:  Adjust  mg qam 150 mg qpm and 450 gm qhs     Sent this in to Machelle and notified can call pharmacy and see if a PA is needed I will do an urgent one. THanks!

## 2022-06-03 ENCOUNTER — HOSPITAL ENCOUNTER (EMERGENCY)
Facility: HOSPITAL | Age: 42
Discharge: HOME | End: 2022-06-03
Attending: EMERGENCY MEDICINE | Admitting: EMERGENCY MEDICINE

## 2022-06-03 VITALS
HEIGHT: 70 IN | OXYGEN SATURATION: 96 % | SYSTOLIC BLOOD PRESSURE: 150 MMHG | WEIGHT: 255 LBS | RESPIRATION RATE: 18 BRPM | HEART RATE: 97 BPM | BODY MASS INDEX: 36.51 KG/M2 | TEMPERATURE: 98.2 F | DIASTOLIC BLOOD PRESSURE: 97 MMHG

## 2022-06-03 DIAGNOSIS — T78.2XXA ANAPHYLAXIS, INITIAL ENCOUNTER: ICD-10-CM

## 2022-06-03 DIAGNOSIS — T78.40XA ALLERGIC REACTION, INITIAL ENCOUNTER: Primary | ICD-10-CM

## 2022-06-03 PROCEDURE — 63710000001 PREDNISONE PER 1 MG: Performed by: NURSE PRACTITIONER

## 2022-06-03 PROCEDURE — 99283 EMERGENCY DEPT VISIT LOW MDM: CPT

## 2022-06-03 RX ORDER — DULOXETIN HYDROCHLORIDE 60 MG/1
150 CAPSULE, DELAYED RELEASE ORAL DAILY
COMMUNITY
End: 2022-08-12 | Stop reason: SINTOL

## 2022-06-03 RX ORDER — FAMOTIDINE 20 MG/1
20 TABLET, FILM COATED ORAL 2 TIMES DAILY
Qty: 20 TABLET | Refills: 0 | Status: SHIPPED | OUTPATIENT
Start: 2022-06-03 | End: 2022-08-12

## 2022-06-03 RX ORDER — PREDNISONE 20 MG/1
60 TABLET ORAL DAILY
Qty: 9 TABLET | Refills: 0 | Status: SHIPPED | OUTPATIENT
Start: 2022-06-03 | End: 2022-08-12

## 2022-06-03 RX ORDER — DIPHENHYDRAMINE HYDROCHLORIDE 50 MG/ML
25 INJECTION INTRAMUSCULAR; INTRAVENOUS ONCE
Status: DISCONTINUED | OUTPATIENT
Start: 2022-06-03 | End: 2022-06-03

## 2022-06-03 RX ORDER — DIPHENHYDRAMINE HCL 25 MG
25 TABLET ORAL EVERY 6 HOURS PRN
Qty: 12 TABLET | Refills: 0 | Status: SHIPPED | OUTPATIENT
Start: 2022-06-03 | End: 2022-08-12

## 2022-06-03 RX ORDER — EPINEPHRINE 0.3 MG/.3ML
0.3 INJECTION SUBCUTANEOUS ONCE
Qty: 3 EACH | Refills: 2 | Status: SHIPPED | OUTPATIENT
Start: 2022-06-03 | End: 2022-06-03

## 2022-06-03 RX ORDER — FAMOTIDINE 20 MG/1
20 TABLET, FILM COATED ORAL ONCE
Status: COMPLETED | OUTPATIENT
Start: 2022-06-03 | End: 2022-06-03

## 2022-06-03 RX ORDER — PREDNISONE 20 MG/1
60 TABLET ORAL ONCE
Status: COMPLETED | OUTPATIENT
Start: 2022-06-03 | End: 2022-06-03

## 2022-06-03 RX ADMIN — PREDNISONE 60 MG: 20 TABLET ORAL at 11:46

## 2022-06-03 RX ADMIN — FAMOTIDINE 20 MG: 20 TABLET ORAL at 11:47

## 2022-06-03 NOTE — ED PROVIDER NOTES
Subjective   Pleasant patient presents the ER with allergic reaction after getting allergy shots.  Patient tells me she had used her EpiPen which completely resolved her symptoms.  She tells me she had a allergy shot today around 830.  She was watched for about an hour and she went back home.  She started to develop a lump in her throat with difficulty breathing.  She called the paramedics and also used her EpiPen which helped her symptoms substantially and was recommended that she come to the ER for further evaluation.  As of right now she feels just fine.  She tells me her symptoms are gone.  There is no chest pain or difficulty breathing.      Allergic Reaction  Presenting symptoms: difficulty breathing, difficulty swallowing and swelling    Presenting symptoms: no wheezing    Severity:  Severe  Duration:  1 hour  Prior allergic episodes:  No prior episodes  Relieved by:  Epinephrine  Worsened by:  Nothing      Review of Systems   Constitutional: Negative for chills, diaphoresis and fever.   HENT: Positive for trouble swallowing. Negative for congestion and sore throat.    Respiratory: Negative for cough, choking, chest tightness, shortness of breath and wheezing.    Cardiovascular: Negative for chest pain and leg swelling.   Gastrointestinal: Negative for abdominal distention, abdominal pain, anal bleeding, blood in stool, constipation, diarrhea, nausea and vomiting.   Genitourinary: Negative for difficulty urinating, dysuria, flank pain, frequency, hematuria and urgency.   All other systems reviewed and are negative.      Past Medical History:   Diagnosis Date   • Anxiety    • Arthritis    • Bowel trouble    • Chronic mental illness    • Depression    • H/O seasonal allergies    • Headache    • Hearing loss    • HPV in female    • Sjogren's syndrome (HCC)    • Trigeminal neuralgia        Allergies   Allergen Reactions   • Azithromycin Itching   • Molds & Smuts Other (See Comments)   • Pollen Extract Other (See  Comments)   • Terbinafine Unknown - Low Severity   • Vicodin [Hydrocodone-Acetaminophen] Itching   • Hydroxychloroquine Rash       Past Surgical History:   Procedure Laterality Date   • CERVICAL BIOPSY     • D & C FIRST TRIMESTER / TX INCOMPLETE / MISSED / SEPTIC / INDUCED   2000   • DENTAL PROCEDURE     • LEEP     • ROOT CANAL         Family History   Problem Relation Age of Onset   • Dementia Mother    • Cancer Mother    • Skin cancer Mother    • Bipolar disorder Mother    • Diabetes Father    • Hypertension Father    • Cancer Father    • Prostate cancer Father    • Heart disease Father    • High cholesterol Father    • Obesity Father    • Depression Sister    • Multiple sclerosis Sister    • Epilepsy Brother    • Breast cancer Maternal Grandmother    • Alcohol abuse Maternal Grandfather        Social History     Socioeconomic History   • Marital status: Single   Tobacco Use   • Smoking status: Never Smoker   • Smokeless tobacco: Never Used   Vaping Use   • Vaping Use: Never used   Substance and Sexual Activity   • Alcohol use: Yes   • Drug use: Never   • Sexual activity: Yes           Objective   Physical Exam  Constitutional:       Appearance: She is well-developed.   HENT:      Head: Normocephalic and atraumatic.      Right Ear: External ear normal.      Left Ear: External ear normal.      Nose: Nose normal.   Eyes:      Conjunctiva/sclera: Conjunctivae normal.      Pupils: Pupils are equal, round, and reactive to light.   Cardiovascular:      Rate and Rhythm: Normal rate and regular rhythm.      Heart sounds: Normal heart sounds.   Pulmonary:      Effort: Pulmonary effort is normal.      Breath sounds: Normal breath sounds.   Abdominal:      General: Bowel sounds are normal.      Palpations: Abdomen is soft.   Musculoskeletal:         General: Normal range of motion.      Cervical back: Normal range of motion and neck supple.   Skin:     General: Skin is warm and dry.   Neurological:      Mental  Status: She is alert and oriented to person, place, and time.   Psychiatric:         Behavior: Behavior normal.         Thought Content: Thought content normal.         Judgment: Judgment normal.         Procedures           ED Course  ED Course as of 06/03/22 1233   Fri Jun 03, 2022   1142 Pleasant patient.  I had a very long sitdown conversation with her and we discussed the presentation as well as the signs symptoms worse condition.  She is asymptomatic.  I will prescribe her more EpiPen's as well as steroids and antihistamines.  Mandatory follow-up with her allergist this week to discuss continuation of her allergy injections.  Did advise her to use the EpiPen earlier on in her symptoms prior to it increasing to a substantial point.  We will observe her in the ER for a little longer and then plan on discharging. [JM]      ED Course User Index  [JM] Zack Arnold APRN                                                 [facility]    Final diagnoses:   Allergic reaction, initial encounter   Anaphylaxis, initial encounter       ED Disposition  ED Disposition     ED Disposition   Discharge    Condition   Stable    Comment   --             Lan Obregon MD  [address] [address]  [phone]    Schedule an appointment as soon as possible for a visit       See your allergist this week for further evaluation of your reaction as well as discussion with continuation of your allergy shots    Schedule an appointment as soon as possible for a visit       [facility] Emergency Department  [address] [address] [phone]    If symptoms worsen         Medication List      New Prescriptions    diphenhydrAMINE 25 MG tablet  Commonly known as: BENADRYL  Take 1 tablet by mouth Every 6 (Six) Hours As Needed for Itching.     famotidine 20 MG tablet  Commonly known as: PEPCID  Take 1 tablet by mouth 2 (Two) Times a Day.     predniSONE 20 MG tablet  Commonly known  as: DELTASONE  Take 3 tablets by mouth Daily. Starting tomorrow        Changed    * EPINEPHrine 0.1 MG/0.1ML solution auto-injector  What changed: Another medication with the same name was added. Make sure you understand how and when to take each.     * EPINEPHrine 0.3 MG/0.3ML solution auto-injector injection  Commonly known as: EPIPEN  Inject 0.3 mL under the skin into the appropriate area as directed 1 (One) Time for 1 dose.  What changed: You were already taking a medication with the same name, and this prescription was added. Make sure you understand how and when to take each.         * This list has 2 medication(s) that are the same as other medications prescribed for you. Read the directions carefully, and ask your doctor or other care provider to review them with you.               Where to Get Your Medications      These medications were sent to [facility] [address] [place] [address] [address] [facility] AT [facility] [phone] [facility] [phone] [address] [address]    Phone: [phone]   · diphenhydrAMINE 25 MG tablet  · EPINEPHrine 0.3 MG/0.3ML solution auto-injector injection  · famotidine 20 MG tablet  · predniSONE 20 MG tablet          Zack Arnold APRN  06/03/22 2504

## 2022-08-05 DIAGNOSIS — G50.0 TRIGEMINAL NEURALGIA: ICD-10-CM

## 2022-08-05 RX ORDER — OXCARBAZEPINE 150 MG/1
TABLET, FILM COATED ORAL
Qty: 210 TABLET | Refills: 2 | Status: SHIPPED | OUTPATIENT
Start: 2022-08-05 | End: 2022-08-12 | Stop reason: SDUPTHER

## 2022-08-05 NOTE — TELEPHONE ENCOUNTER
Rx Refill Note  Requested Prescriptions     Pending Prescriptions Disp Refills   • OXcarbazepine (TRILEPTAL) 150 MG tablet 210 tablet 2     Sig: Take 450mg qAM, 150mg in the afternoon and 450mg qPM      Last filled: 5/2/22 with 2 refills  Sent this in.   Last office visit with prescribing clinician: 4/1/2022      Next office visit with prescribing clinician: 8/12/2022     Sierra Kessler MA  08/05/22, 09:13 EDT

## 2022-08-05 NOTE — TELEPHONE ENCOUNTER
Caller: Bev Vega  Relationship: SELF  Best call back number: [phone]    Requested Prescriptions: OXcarbazepine (TRILEPTAL) 150 MG tablet  Requested Prescriptions      No prescriptions requested or ordered in this encounter        Pharmacy where request should be sent:  [facility]    Additional details provided by patient: PT WILL BE OUT OF MEDICATION OXcarbazepine (TRILEPTAL) 150 MG tablet IN A FEW DAYS    Does the patient have less than a 3 day supply:  [x] Yes  [] No    Jose Daniel Garsia Rep   08/05/22 08:39 EDT

## 2022-08-12 ENCOUNTER — OFFICE VISIT (OUTPATIENT)
Dept: NEUROLOGY | Facility: CLINIC | Age: 42
End: 2022-08-12

## 2022-08-12 VITALS
WEIGHT: 256 LBS | BODY MASS INDEX: 36.65 KG/M2 | HEART RATE: 102 BPM | SYSTOLIC BLOOD PRESSURE: 118 MMHG | DIASTOLIC BLOOD PRESSURE: 74 MMHG | HEIGHT: 70 IN | TEMPERATURE: 96.6 F | OXYGEN SATURATION: 97 %

## 2022-08-12 DIAGNOSIS — M35.07 SJOGREN'S SYNDROME WITH CENTRAL NERVOUS SYSTEM INVOLVEMENT: Chronic | ICD-10-CM

## 2022-08-12 DIAGNOSIS — G43.C0 PERIODIC HEADACHE SYNDROME, NOT INTRACTABLE: ICD-10-CM

## 2022-08-12 DIAGNOSIS — G50.0 TRIGEMINAL NEURALGIA: Primary | Chronic | ICD-10-CM

## 2022-08-12 PROCEDURE — 99214 OFFICE O/P EST MOD 30 MIN: CPT | Performed by: PSYCHIATRY & NEUROLOGY

## 2022-08-12 RX ORDER — TRAMADOL HYDROCHLORIDE 50 MG/1
50 TABLET ORAL NIGHTLY PRN
COMMUNITY
Start: 2022-08-01

## 2022-08-12 RX ORDER — AMOXICILLIN AND CLAVULANATE POTASSIUM 500; 125 MG/1; MG/1
1 TABLET, FILM COATED ORAL
COMMUNITY
Start: 2022-08-01 | End: 2023-01-04

## 2022-08-12 RX ORDER — OXCARBAZEPINE 300 MG/1
900 TABLET, FILM COATED ORAL 2 TIMES DAILY
Qty: 540 TABLET | Refills: 3 | Status: SHIPPED | OUTPATIENT
Start: 2022-08-12 | End: 2022-12-09 | Stop reason: SDUPTHER

## 2022-08-12 NOTE — PROGRESS NOTES
"Chief Complaint  Trigeminal Neuralgia    Subjective        Bev Ferrari presents to [facility] NEUROLOGY     History of Present Illness    42 y.o. female returns in follow up.  Last visit on 4/4/22 continued Lyrica, restarted OXC.       mg qam, 150 mg qpm, 450 mg qhs    Break through pain in afternoon.  Worsens with weather changes.      Dull ache on left side of head.  Does not like hair touching left side of face.   Switches sides.  Mild to moderate intensity.      Problem history:    Pain started over left ear last March 2021.  Constant.  Intensity 6/10.       Progressed to sharp electrical shocks into face.  Touch and cold provoked shocks.   Tx with muscle relaxers and steroids w/o benefit.      Left face sensitive to touch.  Cold and weather changes increases discomfort.       Lyrica decreases pain by a moderate amount.       OXC reduced pain but had increased bruising.      Started on right side but is minor.       Reviewed medical records:     Sx of left facial pain and weakness.  Sx onset months ago.  Sx occur weekly.  Concurrent Sjogren's.     Recent onset of right sided facial pain.       Meds:  Tramadol, Lyrica, baclofen, TPM, OXC, CBZ, TCAD      Labs - 1/4/22 CBC, CMP - NCS      HCT - normal     MRI Brain - NCS     Objective   Vital Signs:  /74   Pulse 102   Temp 96.6 °F (35.9 °C)   Ht 177.8 cm (70\")   Wt 116 kg (256 lb)   SpO2 97%   BMI 36.73 kg/m²   Estimated body mass index is 36.73 kg/m² as calculated from the following:    Height as of this encounter: 177.8 cm (70\").    Weight as of this encounter: 116 kg (256 lb).          Physical Exam  Eyes:      Extraocular Movements: EOM normal.      Pupils: Pupils are equal, round, and reactive to light.   Neurological:      Mental Status: She is oriented to person, place, and time.      Gait: Gait is intact.      Deep Tendon Reflexes: Strength normal.   Psychiatric:         Speech: Speech normal.          Neurologic Exam "     Mental Status   Oriented to person, place, and time.   Speech: speech is normal   Level of consciousness: alert  Knowledge: good and consistent with education.   Normal comprehension.     Cranial Nerves   Cranial nerves II through XII intact.     CN II   Visual fields full to confrontation.   Visual acuity: normal  Right visual field deficit: none  Left visual field deficit: none     CN III, IV, VI   Pupils are equal, round, and reactive to light.  Extraocular motions are normal.   Nystagmus: none   Diplopia: none  Ophthalmoparesis: none  Upgaze: normal  Downgaze: normal  Conjugate gaze: present    CN V   Facial sensation intact.   Right corneal reflex: normal  Left corneal reflex: normal    CN VII   Right facial weakness: none  Left facial weakness: none    CN VIII   Hearing: intact    CN IX, X   Palate: symmetric  Right gag reflex: normal  Left gag reflex: normal    CN XI   Right sternocleidomastoid strength: normal  Left sternocleidomastoid strength: normal    CN XII   Tongue: not atrophic  Fasciculations: absent  Tongue deviation: none  Increased sensitivity L V2      Motor Exam   Muscle bulk: normal  Overall muscle tone: normal    Strength   Strength 5/5 throughout.     Sensory Exam   Light touch normal.     Gait, Coordination, and Reflexes     Gait  Gait: normal    Tremor   Resting tremor: absent  Intention tremor: absent  Action tremor: absent    Reflexes   Reflexes 2+ except as noted.      Result Review :  The following data was reviewed by: Buck Garcia MD on 08/12/2022:  Common labs    Common Labsle 1/4/22 1/4/22 6/8/22 6/8/22    1340 1340 1203 1203   Glucose 97  87    BUN 15  19    Creatinine 0.83  0.85    eGFR Non African Am >60  >60    eGFR African Am >60  >60    Sodium 142  137    Potassium 4.4  3.8    Chloride 105  98    Calcium 9.8  9.0    Albumin 4.4  4.1    Total Bilirubin 0.3  0.3    Alkaline Phosphatase 89  86    AST (SGOT) 15  31    ALT (SGPT) 17  42 (A)    WBC  10.63 (A)  14.51 (A)    Hemoglobin  14.0  14.5   Hematocrit  44.1  44.4   Platelets  330  331   (A) Abnormal value       Comments are available for some flowsheets but are not being displayed.                     Assessment and Plan   Diagnoses and all orders for this visit:    1. Trigeminal neuralgia (Primary)  Assessment & Plan:  Continue OXC and Lyrica     Orders:  -     OXcarbazepine (TRILEPTAL) 300 MG tablet; Take 3 tablets by mouth 2 (Two) Times a Day.  Dispense: 540 tablet; Refill: 3    2. Sjogren's syndrome with central nervous system involvement (HCC)    3. Periodic headache syndrome, not intractable  Assessment & Plan:  Headaches are worsening.  Medication changes per orders.     New Ajovy start                  Follow Up   No follow-ups on file.  Patient was given instructions and counseling regarding her condition or for health maintenance advice. Please see specific information pulled into the AVS if appropriate.

## 2022-08-15 ENCOUNTER — TELEPHONE (OUTPATIENT)
Dept: NEUROLOGY | Facility: CLINIC | Age: 42
End: 2022-08-15

## 2022-08-15 ENCOUNTER — TELEPHONE (OUTPATIENT)
Dept: NEUROLOGY | Facility: OTHER | Age: 42
End: 2022-08-15

## 2022-08-15 NOTE — TELEPHONE ENCOUNTER
Caller: PATIENT     Relationship: SELF    Best call back number: [phone]    What medications are you currently taking:   Current Outpatient Medications on File Prior to Visit   Medication Sig Dispense Refill   • amoxicillin-clavulanate (AUGMENTIN) 500-125 MG per tablet Take 1 tablet by mouth.     • baclofen (LIORESAL) 10 MG tablet Take 10 mg by mouth At Night As Needed.     • EPINEPHrine 0.1 MG/0.1ML solution auto-injector Inject  as directed As Needed.     • loratadine (CLARITIN) 10 MG tablet Take 10 mg by mouth Daily.     • multivitamin with minerals tablet tablet      • OXcarbazepine (TRILEPTAL) 300 MG tablet Take 3 tablets by mouth 2 (Two) Times a Day. 540 tablet 3   • pregabalin (LYRICA) 200 MG capsule Take 200 mg by mouth 3 (Three) Times a Day.     • traMADol (ULTRAM) 50 MG tablet Take 50 mg by mouth At Night As Needed.       No current facility-administered medications on file prior to visit.        Which medication are you concerned about: OXCARBAZEPINE     Who prescribed you this medication: STU    What are your concerns: GOT A MSG FROM PHARMACY THAT THE RX WAS READY AND THE COST WAS $30.  IT IS USUALLY $10 SO SHE WAS WONDERING WHAT THE DIFFERENCE WAS DUE TO.  SHE WILL CALL THE PHARMACY TO CHECK WITH THEM AND IF THERE IS AN ISSUE THEY CAN'T ADDRESS SHE WILL CALL BACK.

## 2022-08-15 NOTE — TELEPHONE ENCOUNTER
Provider: STU   Caller: PATIENT  Relationship to Patient: SELF  Pharmacy: N/A  Phone Number: [phone]  Reason for Call: PATIENT CALLED TO UPDATE ORIGINAL MESSAGE BY ABRAM MEJIA (ROUTED TO WRONG LOCATION PER [facility])    ORIGINAL MESSAGE:GOT A MSG FROM PHARMACY THAT THE RX WAS READY AND THE COST WAS $30.  IT IS USUALLY $10 SO SHE WAS WONDERING WHAT THE DIFFERENCE WAS DUE TO.  SHE WILL CALL THE PHARMACY TO CHECK WITH THEM AND IF THERE IS AN ISSUE THEY CAN'T ADDRESS SHE WILL CALL BACK.    PATIENT CALLED BACK TO ADVISE THE REASON RX FOR OXCARBAZEPINE  COST MORE THAN THE USUAL AMOUNT IS BECAUSE RX WAS FILLED FOR 3 MONTHS VS 1 MONTH.    PLEASE CALL WITH QUESTIONS OR CONCERNS.    THANK YOU

## 2022-08-24 ENCOUNTER — TELEPHONE (OUTPATIENT)
Dept: NEUROLOGY | Facility: CLINIC | Age: 42
End: 2022-08-24

## 2022-08-24 NOTE — TELEPHONE ENCOUNTER
PATIENT CALLED TO FOLLOW UP ON ORIGINAL MESSAGE.  ADVISED DR. PERAZA INSTRUCTS TO GO BACK TO ORIGINAL DOSE AND SEE IF SYMPTONS IMPROVE.  PATIENT WILL FOLLOW THESE INSTRUCTIONS.

## 2022-08-24 NOTE — TELEPHONE ENCOUNTER
Notified patient who states understanding. She will keep us updated it's hard to tell what the cause is. States body is attacking herself so it's hard to tell.

## 2022-08-24 NOTE — TELEPHONE ENCOUNTER
Caller: Bev Ferrari    Relationship: Self    Best call back number: [phone]    Preferred pharmacy: [address] AT [facility] [phone] [facility] [phone] FX    What medications are you currently taking:   Current Outpatient Medications on File Prior to Visit   Medication Sig Dispense Refill   • amoxicillin-clavulanate (AUGMENTIN) 500-125 MG per tablet Take 1 tablet by mouth.     • baclofen (LIORESAL) 10 MG tablet Take 10 mg by mouth At Night As Needed.     • EPINEPHrine 0.1 MG/0.1ML solution auto-injector Inject  as directed As Needed.     • loratadine (CLARITIN) 10 MG tablet Take 10 mg by mouth Daily.     • multivitamin with minerals tablet tablet      • OXcarbazepine (TRILEPTAL) 300 MG tablet Take 3 tablets by mouth 2 (Two) Times a Day. 540 tablet 3   • pregabalin (LYRICA) 200 MG capsule Take 200 mg by mouth 3 (Three) Times a Day.     • traMADol (ULTRAM) 50 MG tablet Take 50 mg by mouth At Night As Needed.       No current facility-administered medications on file prior to visit.     Which medication are you concerned about: Oxcarbazepine (TRILEPTAL) 300 MG tablet- PT IS CURRENTLY TAKING 2 TABLETS IN THE MORNING, 1 TABLET IN THE AFTERNOON, AND 2 TABLETS IN THE EVENING.    Who prescribed you this medication: DR. PERAZA    When did you start taking these medications:  MEDICATION INCREASED ON 8/12/22    What are your concerns: PT REPORTS THAT SINCE MEDICATION CHANGE, SHE HAS STARTED TO EXPERIENCE FULL FACIAL NUMBNESS THAT SHE DESCRIBES AS PAINFUL, MIGRAINE-LIKE PAIN ON BOTH SIDES OF HER HEAD. PT ALSO EXPRESSES SHE HAS HAD BLURRED VISION SINCE INCREASING MEDICATION DOSAGE AS WELL.    *PT HAS JAW BONE GRAFT COMPLETED 3 WEEKS AGO WHICH SHE FEELS HAS CONTRIBUTED TO HER CONCERNS.    SPOKE W/ BETH, WHOM ADVISED SHE WOULD CALL PT BACK SHORTLY TO FURTHER TRIAGE SYMPTOMS.    PLEASE REVIEW AND ADVISE.

## 2022-08-29 ENCOUNTER — TRANSCRIBE ORDERS (OUTPATIENT)
Dept: ADMINISTRATIVE | Facility: HOSPITAL | Age: 42
End: 2022-08-29

## 2022-08-29 DIAGNOSIS — M79.662 PAIN IN LEFT LOWER LEG: Primary | ICD-10-CM

## 2022-08-30 ENCOUNTER — TELEPHONE (OUTPATIENT)
Dept: NEUROLOGY | Facility: CLINIC | Age: 42
End: 2022-08-30

## 2022-08-30 NOTE — TELEPHONE ENCOUNTER
Provider: STU    Caller: PATIENT  Relationship to Patient: SELF  Pharmacy: [facility]   Phone Number: [phone]  Reason for Call: PATIENT TELEPHONED RE: F/U WITH DR PERAZA REGARDING HER DENTIST DOES NOT SEE ANYTHING WRONG & IT MAY TAKE A WHILE FOR PAIN RECEPTORS TO GO AWAY. PATIENT WOULD LIKE TO INCREASE MEDS OXCARBAZEPINE (TRILEPTAL) 300 MG TABLET BACK TO NORMAL DOSAGE    PLEASE CALL & ADVISE

## 2022-08-30 NOTE — TELEPHONE ENCOUNTER
Yoel Pablo and notified  is fine with this. May have to Play around with the doses to see what she can tolerate and what dose works best for her. She is going to take baby steps with this. States she still has some of the 150's left so is doing some 450mg and has gone up to 600mg.  She is just used to providers giving exact instructions but will play around with the doses and keep us updated. Hopefully finds the combination that will work.     Went to her periodontist who states no infection after bone graft. States maybe still feeling pain because of those pain receptors and may take some time for this to go away. Offered her Tramadol but she doesn't want to have to take a med for pain just wants it to be gone in the 1st place.

## 2022-09-06 DIAGNOSIS — G50.0 TRIGEMINAL NEURALGIA: Primary | ICD-10-CM

## 2022-09-06 RX ORDER — PREGABALIN 200 MG/1
200 CAPSULE ORAL 3 TIMES DAILY
Qty: 270 CAPSULE | Refills: 1 | Status: SHIPPED | OUTPATIENT
Start: 2022-09-06 | End: 2022-12-09 | Stop reason: SDUPTHER

## 2022-09-06 NOTE — TELEPHONE ENCOUNTER
Caller: Vega Bev    Relationship: Self    Best call back number: [phone]    Requested Prescriptions:   Requested Prescriptions     Pending Prescriptions Disp Refills   • pregabalin (LYRICA) 200 MG capsule       Sig: Take 1 capsule by mouth 3 (Three) Times a Day.        Pharmacy where request should be sent:  [id number] [phone]    Additional details provided by patient: RX HAS  &  PATIENT IS TRAVELING OUT OF TOWN & NEEDS THIS RX REFILLED BY THE END OF TODAY    Does the patient have less than a 3 day supply:  [x] Yes  [] No    Jose Daniel Davis Rep   22 09:14 EDT

## 2022-09-06 NOTE — TELEPHONE ENCOUNTER
Rx Refill Note  Requested Prescriptions     Pending Prescriptions Disp Refills   • pregabalin (LYRICA) 200 MG capsule       Sig: Take 1 capsule by mouth 3 (Three) Times a Day.      Last filled: 11/17/2021   Last office visit with prescribing clinician: 8/12/2022      Next office visit with prescribing clinician: 12/9/2022     Leena Rey MA  09/06/22, 09:28 EDT

## 2022-09-07 ENCOUNTER — SPECIALTY PHARMACY (OUTPATIENT)
Dept: ONCOLOGY | Facility: HOSPITAL | Age: 42
End: 2022-09-07

## 2022-09-07 NOTE — PROGRESS NOTES
Specialty Pharmacy Patient Management Program  Neurology Initial Assessment     Bev Ferrari is a 42 y.o. female with migraines seen by a Neurology provider and enrolled in the Neurology Patient Management program offered by [facility].  An initial outreach was conducted, including assessment of therapy appropriateness and specialty medication education for Emgality.  Patient was given Ajovy in the office on 8/12/22, but insurance required Emgality.  Patient and provider notified and education completed.  The patient was introduced to services offered by [facility], including: regular assessments, refill coordination, curbside pick-up or mail order delivery options, prior authorization maintenance, and financial assistance programs as applicable. The patient was also provided with contact information for the pharmacy team.     Insurance Coverage & Financial Support  [facility], Emgality co-pay card.    Relevant Past Medical History and Comorbidities  Relevant medical history and concomitant health conditions were discussed with the patient. The patient's chart has been reviewed for relevant past medical history and comorbid health conditions and updated as necessary.   Past Medical History:   Diagnosis Date   • Anxiety    • Arthritis    • Bowel trouble    • Chronic mental illness    • Depression    • Fibromyalgia, primary    • H/O seasonal allergies    • Headache    • Headache, tension-type    • Hearing loss    • HPV in female    • Periodic headache syndrome, not intractable 8/12/2022   • Sjogren's syndrome (HCC)    • Trigeminal neuralgia    • Vision loss      Social History     Socioeconomic History   • Marital status: Single   Tobacco Use   • Smoking status: Never Smoker   • Smokeless tobacco: Never Used   • Tobacco comment: 2nd hand exposure to cigarettes and vaping.   Vaping Use   • Vaping Use: Never used   Substance and Sexual Activity   • Alcohol use: Not  Currently     Comment: Stopped all alcohol when started on [place] meds.   • Drug use: Never   • Sexual activity: Yes     Partners: Male     Birth control/protection: Other     Comment: Pull-out method.       Problem list reviewed by Dominga Wetzel PharmD on 9/7/2022 at 10:04 AM    Allergies  Known allergies and reactions were discussed with the patient. The patient's chart has been reviewed for  allergy information and updated as necessary.   Azithromycin, Molds & smuts, Pollen extract, Terbinafine, Hydrocodone-acetaminophen, and Hydroxychloroquine    Allergies reviewed by Dominga Wetzel PharmD on 9/7/2022 at 10:04 AM    Current Medication List  This medication list has been reviewed with the patient and evaluated for any interactions or necessary modifications/recommendations, and updated to include all prescription medications, OTC medications, and supplements the patient is currently taking.  This list reflects what is contained in the patient's profile, which has also been marked as reviewed to communicate to other providers it is the most up to date version of the patient's current medication therapy.     Current Outpatient Medications:   •  amoxicillin-clavulanate (AUGMENTIN) 500-125 MG per tablet, Take 1 tablet by mouth., Disp: , Rfl:   •  baclofen (LIORESAL) 10 MG tablet, Take 10 mg by mouth At Night As Needed., Disp: , Rfl:   •  EPINEPHrine 0.1 MG/0.1ML solution auto-injector, Inject  as directed As Needed., Disp: , Rfl:   •  galcanezumab-gnlm (EMGALITY) 120 MG/ML auto-injector pen, Inject 1 mL under the skin into the appropriate area as directed Every 28 (Twenty-Eight) Days., Disp: 1 mL, Rfl: 11  •  loratadine (CLARITIN) 10 MG tablet, Take 10 mg by mouth Daily., Disp: , Rfl:   •  multivitamin with minerals tablet tablet, , Disp: , Rfl:   •  OXcarbazepine (TRILEPTAL) 300 MG tablet, Take 3 tablets by mouth 2 (Two) Times a Day., Disp: 540 tablet, Rfl: 3  •  pregabalin (LYRICA) 200 MG capsule, Take 1 capsule  by mouth 3 (Three) Times a Day., Disp: 270 capsule, Rfl: 1  •  traMADol (ULTRAM) 50 MG tablet, Take 50 mg by mouth At Night As Needed., Disp: , Rfl:     Medicines reviewed by Dominga Wetzel, PharmD on 9/7/2022 at 10:04 AM    Drug Interactions  none     Recommended Medications Assessment    None      Relevant Laboratory Values    Common labs    Common Labsle 1/4/22 1/4/22 6/8/22 6/8/22    1340 1340 1203 1203   Glucose 97  87    BUN 15  19    Creatinine 0.83  0.85    eGFR Non African Am >60  >60    eGFR African Am >60  >60    Sodium 142  137    Potassium 4.4  3.8    Chloride 105  98    Calcium 9.8  9.0    Albumin 4.4  4.1    Total Bilirubin 0.3  0.3    Alkaline Phosphatase 89  86    AST (SGOT) 15  31    ALT (SGPT) 17  42 (A)    WBC  10.63 (A)  14.51 (A)   Hemoglobin  14.0  14.5   Hematocrit  44.1  44.4   Platelets  330  331   (A) Abnormal value       Comments are available for some flowsheets but are not being displayed.             Initial Education Provided for Specialty Medication  The patient has been provided with the following education and any applicable administration techniques (i.e. self-injection) have been demonstrated for the therapies indicated. All questions and concerns have been addressed prior to the patient receiving the medication, and the patient has verbalized understanding of the education and any materials provided.  Additional patient education shall be provided and documented upon request by the patient, provider or payer.                   Emgality (Galcanezumab-[facility])           Medication Expectations   Why am I taking this medication? You are taking this medication for migraine prophylaxis.   What should I expect while on this medication? You should expect to a decrease in the frequency and severity of your migraines.   How does the medication work? Emgality is a monoclonal antibody that binds to calcitonin gene-related peptide (CGRP) and blocks its binding to the receptor decreasing the  severity of migraines.   How long will I be on this medication for? The amount of time you will be on this medication will be determined by your doctor and your response to the medication.    How do I take this medication? Take as directed on your prescription label. This medication is a self-injection given every 28 days.    What are some possible side effects? Injection site reactions and hypersensitivity reactions.   What happens if I miss a dose? If you miss a dose, take it as soon as you remember, and time next dose 28 days from last dose.                  Medication Safety   What are things I should warn my doctor immediately about? Hypersensitivity reactions.   What are things that I should be cautious of? Injection site reaction.   What are some medications that can interact with this one? No drug interactions identified.            Medication Storage/Handling   How should I handle this medication? Keep this medication our of reach of pets/children in original container.  On the day your Emgality is due let it set at room temperature for 30 minutes prior to injection. (do NOT warm using a heat source such as hot water or a microwave).  Administer in the abdomen, thigh, back of the upper arm, or buttocks.  Do not inject where the skin is tender, bruised, red or hard.  Rotate injection sites.   How does this medication need to be stored? Store in refrigerator and keep dry.   How should I dispose of this medication? You can dispose of the empty syringe in a sharps container, and if this is not available you may use an empty hard plastic container such as a milk jug or tide container.            Resources/Support   How can I remind myself to take this medication? You can download a reminder candida on your phone or use a calandar  to help with your monthly injection.   Is financial support available?  Yes, [employer] can provide co-pay cards if you have commercial insurance or patient assistance if you have Medicare or  no insurance.    Which vaccines are recommended for me? Talk to your doctor about these vaccines: Flu, Coronavirus (COVID-19), Pneumococcal (pneumonia), Tdap, Hepatitis B, Zoster (shingles)                  Adherence and Self-Administration  • Barriers to Patient Adherence and/or Self-Administration: None    • Methods for Supporting Patient Adherence and/or Self-Administration: CGRP self-injection completed on 8/12/22 during office visit.  Education done during initial assessment, and patient is to watch the Emgality self-injection video on [employer]'s site.    Goals of Therapy   Goals     • Specialty Pharmacy General Goal      Maintain adherence of greater than 80%.             Reassessment Plan & Follow-Up  1. Medication Therapy Changes: Start Emgality 120 mg SubQ every 28 days.  2. Additional Plans, Therapy Recommendations, or Therapy Problems to Be Addressed: none  3. Pharmacist to perform regular reassessments no more than (6) months from the previous assessment.  4. Welcome information and patient satisfaction survey to be sent by retail team with patient's initial fill.  5. Care Coordinator to set up future refill outreaches, coordinate prescription delivery, and escalate clinical questions to pharmacist.     Attestation  I attest that the initiated specialty medication(s) are appropriate for the patient based on my assessment.  If the prescribed therapy is at any point deemed not appropriate based on the current or future assessments, a consultation will be initiated with the patient's specialty care provider to determine the best course of action. The revised plan of therapy will be documented along with any additional patient education provided.     Dominga Wetzel, PharmD  9/7/2022  10:07 EDT

## 2022-09-07 NOTE — PROGRESS NOTES
Specialty Pharmacy Refill Coordination Note     Bev is a 42 y.o. female contacted today regarding new start of Emgality specialty medication(s). Patient given first injection in office on 8/12. Patient is going out of town tomorrow so she is going to take it today 9/7/2022.    Reviewed and verified with patient: Yes  Specialty medication(s) and dose(s) confirmed: yes        Delivery Questions    Flowsheet Row Most Recent Value   Delivery method  at Pharmacy   Number of medications in delivery 1   Medication being filled and delivered Emgality   Doses left of specialty medications None. New start.   Is there any medication that is due not being filled? No   Supplies needed? No supplies needed   Cooler needed? No   Do any medications need mixed or dated? No   Copay form of payment Pay at pickup   Questions or concerns for the pharmacist? No   Are any medications first time fills? Yes  [New Emgality start]                 Follow-up:  28 days     Roxie Stern, Pharmacy Technician  Specialty Pharmacy Technician

## 2022-09-28 ENCOUNTER — TELEPHONE (OUTPATIENT)
Dept: NEUROLOGY | Facility: CLINIC | Age: 42
End: 2022-09-28

## 2022-09-28 NOTE — TELEPHONE ENCOUNTER
"  Caller: MACY   Relationship to Patient: SELF  Phone Number: [phone]    Reason for Call: PT NEEDS A LETTER FOR HER JOB THAT STATES HER MIGRAINES CAN BE TRIGGERED BY \"[employer]\" , SHE STATES HER JOB IS TRANSFERRING HER FROM A PRIVATE OFFICE TO A CUBICAL SETTING. SHE IS GOING THROUGH HER UNION TO TRY TO DO A \"RESONABLE ACCOMODATION REQUEST\" COMPLETED AND JUST NEEDS A LETTER FROM HER PROVIDER   "

## 2022-09-29 NOTE — TELEPHONE ENCOUNTER
Called Bev and let her know  states we can provide her with this letter. She states understanding. She has worked for the [employer] for 7 years and they had given a 30 day notice that they would be moving her from her private office to a public cubical setting. They do have a union that had reached out to her to ask if she was ok with this decision and she let them know about her Migraines, light and sound sensitivity that triggers them.    She is sometimes able to work from home so is not sure if they will accommodate her by making that an all the time thing or if they will be able to keep her in a private office with dimmed lighting while she is there.    Notified will complete letter and after Jose has signed will leave this up front with Sujata. States she will be by either today or tom to pick this up. Thanks!

## 2022-09-30 ENCOUNTER — SPECIALTY PHARMACY (OUTPATIENT)
Dept: ONCOLOGY | Facility: HOSPITAL | Age: 42
End: 2022-09-30

## 2022-09-30 ENCOUNTER — APPOINTMENT (OUTPATIENT)
Dept: CARDIOLOGY | Facility: HOSPITAL | Age: 42
End: 2022-09-30

## 2022-09-30 NOTE — PROGRESS NOTES
Specialty Pharmacy Refill Coordination Note     Bev is a 42 y.o. female contacted today regarding refills of Emgality specialty medication(s). Patient's last injection of Emgality was given on 9/8/2022. Patient's next injection of Emgality due 10/6/2022.    Reviewed and verified with patient: Yes  Specialty medication(s) and dose(s) confirmed: yes    Refill Questions    Flowsheet Row Most Recent Value   Changes to allergies? No   Changes to medications? No   New conditions since last clinic visit Yes  [Covid infection as off 9/14. Symptoms are improving. Diagnosed on a cruise ship.]   Unplanned office visit, urgent care, ED, or hospital admission in the last 4 weeks  No   How does patient/caregiver feel medication is working? Very good   Financial problems or insurance changes  No   Since the previous refill, were any specialty medication doses or scheduled injections missed or delayed?  No   Does this patient require a clinical escalation to a pharmacist? Yes  [Covid infection as off 9/14. Symptoms are improving. Diagnosed on a cruise ship.]          Delivery Questions    Flowsheet Row Most Recent Value   Delivery method [employer]Ex   Delivery address correct? Yes   Preferred delivery time? Anytime   Number of medications in delivery 1   Medication being filled and delivered Emgality   Doses left of specialty medications None. Once monthly injection.   Is there any medication that is due not being filled? No   Supplies needed? No supplies needed   Cooler needed? Yes   Do any medications need mixed or dated? No   Copay form of payment Credit card on file   Questions or concerns for the pharmacist? No   Are any medications first time fills? No                 Follow-up:  28 days.     Roxie Stern, Pharmacy Technician  Specialty Pharmacy Technician

## 2022-10-07 ENCOUNTER — HOSPITAL ENCOUNTER (OUTPATIENT)
Dept: CARDIOLOGY | Facility: HOSPITAL | Age: 42
Discharge: HOME | End: 2022-10-07
Admitting: STUDENT IN AN ORGANIZED HEALTH CARE EDUCATION/TRAINING PROGRAM

## 2022-10-07 VITALS — WEIGHT: 257.94 LBS | BODY MASS INDEX: 36.93 KG/M2 | HEIGHT: 70 IN

## 2022-10-07 DIAGNOSIS — M79.662 PAIN IN LEFT LOWER LEG: ICD-10-CM

## 2022-10-07 LAB
BH CV LOWER VASCULAR LEFT COMMON FEMORAL AUGMENT: NORMAL
BH CV LOWER VASCULAR LEFT COMMON FEMORAL COMPETENT: NORMAL
BH CV LOWER VASCULAR LEFT COMMON FEMORAL COMPRESS: NORMAL
BH CV LOWER VASCULAR LEFT COMMON FEMORAL PHASIC: NORMAL
BH CV LOWER VASCULAR LEFT COMMON FEMORAL SPONT: NORMAL
BH CV LOWER VASCULAR LEFT DISTAL FEMORAL AUGMENT: NORMAL
BH CV LOWER VASCULAR LEFT DISTAL FEMORAL COMPETENT: NORMAL
BH CV LOWER VASCULAR LEFT DISTAL FEMORAL COMPRESS: NORMAL
BH CV LOWER VASCULAR LEFT DISTAL FEMORAL PHASIC: NORMAL
BH CV LOWER VASCULAR LEFT DISTAL FEMORAL SPONT: NORMAL
BH CV LOWER VASCULAR LEFT GASTRONEMIUS COMPRESS: NORMAL
BH CV LOWER VASCULAR LEFT GREATER SAPH AK COMPRESS: NORMAL
BH CV LOWER VASCULAR LEFT GREATER SAPH BK COMPRESS: NORMAL
BH CV LOWER VASCULAR LEFT LESSER SAPH COMPRESS: NORMAL
BH CV LOWER VASCULAR LEFT MID FEMORAL AUGMENT: NORMAL
BH CV LOWER VASCULAR LEFT MID FEMORAL COMPETENT: NORMAL
BH CV LOWER VASCULAR LEFT MID FEMORAL COMPRESS: NORMAL
BH CV LOWER VASCULAR LEFT MID FEMORAL PHASIC: NORMAL
BH CV LOWER VASCULAR LEFT MID FEMORAL SPONT: NORMAL
BH CV LOWER VASCULAR LEFT PERONEAL COMPRESS: NORMAL
BH CV LOWER VASCULAR LEFT POPLITEAL AUGMENT: NORMAL
BH CV LOWER VASCULAR LEFT POPLITEAL COMPETENT: NORMAL
BH CV LOWER VASCULAR LEFT POPLITEAL COMPRESS: NORMAL
BH CV LOWER VASCULAR LEFT POPLITEAL PHASIC: NORMAL
BH CV LOWER VASCULAR LEFT POPLITEAL SPONT: NORMAL
BH CV LOWER VASCULAR LEFT POSTERIOR TIBIAL COMPRESS: NORMAL
BH CV LOWER VASCULAR LEFT PROFUNDA FEMORAL COMPRESS: NORMAL
BH CV LOWER VASCULAR LEFT PROFUNDA FEMORAL PHASIC: NORMAL
BH CV LOWER VASCULAR LEFT PROFUNDA FEMORAL SPONT: NORMAL
BH CV LOWER VASCULAR LEFT PROXIMAL FEMORAL AUGMENT: NORMAL
BH CV LOWER VASCULAR LEFT PROXIMAL FEMORAL COMPETENT: NORMAL
BH CV LOWER VASCULAR LEFT PROXIMAL FEMORAL COMPRESS: NORMAL
BH CV LOWER VASCULAR LEFT PROXIMAL FEMORAL PHASIC: NORMAL
BH CV LOWER VASCULAR LEFT PROXIMAL FEMORAL SPONT: NORMAL
BH CV LOWER VASCULAR LEFT SAPHENOFEMORAL JUNCTION AUGMENT: NORMAL
BH CV LOWER VASCULAR LEFT SAPHENOFEMORAL JUNCTION COMPETENT: NORMAL
BH CV LOWER VASCULAR LEFT SAPHENOFEMORAL JUNCTION COMPRESS: NORMAL
BH CV LOWER VASCULAR LEFT SAPHENOFEMORAL JUNCTION PHASIC: NORMAL
BH CV LOWER VASCULAR LEFT SAPHENOFEMORAL JUNCTION SPONT: NORMAL
BH CV LOWER VASCULAR RIGHT COMMON FEMORAL AUGMENT: NORMAL
BH CV LOWER VASCULAR RIGHT COMMON FEMORAL COMPETENT: NORMAL
BH CV LOWER VASCULAR RIGHT COMMON FEMORAL COMPRESS: NORMAL
BH CV LOWER VASCULAR RIGHT COMMON FEMORAL PHASIC: NORMAL
BH CV LOWER VASCULAR RIGHT COMMON FEMORAL SPONT: NORMAL
MAXIMAL PREDICTED HEART RATE: 177 BPM
STRESS TARGET HR: 150 BPM

## 2022-10-07 PROCEDURE — 93971 EXTREMITY STUDY: CPT

## 2022-10-07 PROCEDURE — 93971 EXTREMITY STUDY: CPT | Performed by: INTERNAL MEDICINE

## 2022-10-24 ENCOUNTER — SPECIALTY PHARMACY (OUTPATIENT)
Dept: ONCOLOGY | Facility: HOSPITAL | Age: 42
End: 2022-10-24

## 2022-10-24 RX ORDER — LEVOCETIRIZINE DIHYDROCHLORIDE 5 MG/1
5 TABLET, FILM COATED ORAL EVERY EVENING
COMMUNITY

## 2022-10-24 RX ORDER — MONTELUKAST SODIUM 10 MG/1
10 TABLET ORAL NIGHTLY
COMMUNITY

## 2022-10-24 NOTE — PROGRESS NOTES
Specialty Pharmacy Refill Coordination Note     Bev is a 42 y.o. female contacted today regarding refills of Emgality specialty medication(s). Patient's last injection of Emgality was given on 10/6/2022. Patient's next injection of Emglaity due 11/3/2022.    Reviewed and verified with patient: Yes  Specialty medication(s) and dose(s) confirmed: yes    Refill Questions    Flowsheet Row Most Recent Value   Changes to allergies? No   Changes to medications? Yes  [About 2 weeks ago started levocetirizine 5mg once daily, and montelukast 10mg once daily.]   New conditions since last clinic visit No   Unplanned office visit, urgent care, ED, or hospital admission in the last 4 weeks  No   How does patient/caregiver feel medication is working? Very good   Financial problems or insurance changes  No   Since the previous refill, were any specialty medication doses or scheduled injections missed or delayed?  No   Does this patient require a clinical escalation to a pharmacist? Yes  [About 2 weeks ago started levocetirizine 5mg once daily, and montelukast 10mg once daily.]          Delivery Questions    Flowsheet Row Most Recent Value   Delivery method FedEx   Delivery address correct? Yes   Preferred delivery time? Anytime   Number of medications in delivery 1   Medication being filled and delivered Emgality   Doses left of specialty medications None. Once monthly injection.   Is there any medication that is due not being filled? No   Supplies needed? No supplies needed   Cooler needed? Yes   Do any medications need mixed or dated? No   Copay form of payment Credit card on file   Questions or concerns for the pharmacist? No   Are any medications first time fills? No                 Follow-up:  28 days     Roxie Stern, Pharmacy Technician  Specialty Pharmacy Technician

## 2022-10-24 NOTE — PROGRESS NOTES
Refill outreach escalated to pharmacist d/t addition of levocetirizine and montelukast. Med list updated and ok to proceed with Emgality refill.    Elizabeth Sherwood, PharmD, MPA, BCPS, MSCS  10/24/2022  15:29 EDT

## 2022-11-16 ENCOUNTER — SPECIALTY PHARMACY (OUTPATIENT)
Dept: ONCOLOGY | Facility: HOSPITAL | Age: 42
End: 2022-11-16

## 2022-11-16 NOTE — PROGRESS NOTES
Specialty Pharmacy Refill Coordination Note     Bev is a 42 y.o. female contacted today regarding refills of  Emgality specialty medication(s). Patient is due for injection on 12/1.      Reviewed and verified with patient:       Specialty medication(s) and dose(s) confirmed: yes    Refill Questions    Flowsheet Row Most Recent Value   Changes to allergies? No   Changes to medications? No   New conditions since last clinic visit No   Unplanned office visit, urgent care, ED, or hospital admission in the last 4 weeks  No   How does patient/caregiver feel medication is working? Very good   Financial problems or insurance changes  No   Since the previous refill, were any specialty medication doses or scheduled injections missed or delayed?  No   Does this patient require a clinical escalation to a pharmacist? No          Delivery Questions    Flowsheet Row Most Recent Value   Delivery method FedEx   Delivery address correct? Yes   Preferred delivery time? Anytime   Number of medications in delivery 1   Medication being filled and delivered Emgality   Doses left of specialty medications 0   Is there any medication that is due not being filled? No   Supplies needed? No supplies needed   Cooler needed? Yes   Do any medications need mixed or dated? No   Copay form of payment Payment plan already set up   Questions or concerns for the pharmacist? No   Are any medications first time fills? No                 Follow-up: 28 day(s)     Zainab Johnston, Pharmacy Technician  Specialty Pharmacy Technician

## 2022-12-09 ENCOUNTER — OFFICE VISIT (OUTPATIENT)
Dept: NEUROLOGY | Facility: CLINIC | Age: 42
End: 2022-12-09

## 2022-12-09 VITALS
WEIGHT: 261 LBS | HEART RATE: 104 BPM | OXYGEN SATURATION: 97 % | BODY MASS INDEX: 37.45 KG/M2 | DIASTOLIC BLOOD PRESSURE: 78 MMHG | SYSTOLIC BLOOD PRESSURE: 122 MMHG

## 2022-12-09 DIAGNOSIS — G43.C0 PERIODIC HEADACHE SYNDROME, NOT INTRACTABLE: Chronic | ICD-10-CM

## 2022-12-09 DIAGNOSIS — M35.07 SJOGREN'S SYNDROME WITH CENTRAL NERVOUS SYSTEM INVOLVEMENT: Chronic | ICD-10-CM

## 2022-12-09 DIAGNOSIS — G50.0 TRIGEMINAL NEURALGIA: Primary | Chronic | ICD-10-CM

## 2022-12-09 PROCEDURE — 99214 OFFICE O/P EST MOD 30 MIN: CPT | Performed by: PSYCHIATRY & NEUROLOGY

## 2022-12-09 RX ORDER — PREGABALIN 200 MG/1
200 CAPSULE ORAL 3 TIMES DAILY
Qty: 270 CAPSULE | Refills: 1 | Status: SHIPPED | OUTPATIENT
Start: 2022-12-09

## 2022-12-09 RX ORDER — OXCARBAZEPINE 300 MG/1
900 TABLET, FILM COATED ORAL 2 TIMES DAILY
Qty: 540 TABLET | Refills: 3 | Status: SHIPPED | OUTPATIENT
Start: 2022-12-09 | End: 2023-12-09

## 2022-12-09 NOTE — PROGRESS NOTES
"Chief Complaint    Trigeminal Neuralgia    Subjective        Bev Ferrari presents to [facility] NEUROLOGY [facility]     History of Present Illness    42 y.o. female returns in follow up.  Last visit on 8/12/22 continued OXC. PGB, rx Ajovy.      Switched to Emgality.       mg TID     mg TID    Occurs daily.  Located over left side.      Decreased breakthrough pain .       Break through pain in afternoon.  Worsens with weather changes.       Dull ache on left side of head.  Does not like hair touching left side of face.   Switches sides.  Mild to moderate intensity.       Problem history:     Pain started over left ear last March 2021.  Constant.  Intensity 6/10.       Progressed to sharp electrical shocks into face.  Touch and cold provoked shocks.   Tx with muscle relaxers and steroids w/o benefit.      Left face sensitive to touch.  Cold and weather changes increases discomfort.       Lyrica decreases pain by a moderate amount.       OXC reduced pain but had increased bruising.      Started on right side but is minor.       Reviewed medical records:     Sx of left facial pain and weakness.  Sx onset months ago.  Sx occur weekly.  Concurrent Sjogren's.     Recent onset of right sided facial pain.       Meds:  Tramadol, Lyrica, baclofen, TPM, OXC, CBZ, TCAD      Labs - 1/4/22 CBC, CMP - [facility]      HCT - normal     MRI Brain - N[facility]     Objective   Vital Signs:  /78   Pulse 104   Wt 118 kg (261 lb)   SpO2 97%   BMI 37.45 kg/m²   Estimated body mass index is 37.45 kg/m² as calculated from the following:    Height as of 10/7/22: 177.8 cm (70\").    Weight as of this encounter: 118 kg (261 lb).          Physical Exam  Eyes:      Extraocular Movements: EOM normal.      Pupils: Pupils are equal, round, and reactive to light.   Neurological:      Mental Status: She is oriented to person, place, and time.      Cranial Nerves: Cranial nerves 2-12 are intact.      Motor: Motor strength " is normal.      Gait: Gait is intact.   Psychiatric:         Speech: Speech normal.          Neurologic Exam     Mental Status   Oriented to person, place, and time.   Speech: speech is normal   Level of consciousness: alert  Knowledge: good and consistent with education.   Normal comprehension.     Cranial Nerves   Cranial nerves II through XII intact.     CN II   Visual fields full to confrontation.   Visual acuity: normal  Right visual field deficit: none  Left visual field deficit: none     CN III, IV, VI   Pupils are equal, round, and reactive to light.  Extraocular motions are normal.   Nystagmus: none   Diplopia: none  Ophthalmoparesis: none  Upgaze: normal  Downgaze: normal  Conjugate gaze: present    CN V   Facial sensation intact.   Right corneal reflex: normal  Left corneal reflex: normal    CN VII   Right facial weakness: none  Left facial weakness: none    CN VIII   Hearing: intact    CN IX, X   Palate: symmetric  Right gag reflex: normal  Left gag reflex: normal    CN XI   Right sternocleidomastoid strength: normal  Left sternocleidomastoid strength: normal    CN XII   Tongue: not atrophic  Fasciculations: absent  Tongue deviation: none    Motor Exam   Muscle bulk: normal  Overall muscle tone: normal    Strength   Strength 5/5 throughout.     Sensory Exam   Light touch normal.     Gait, Coordination, and Reflexes     Gait  Gait: normal    Tremor   Resting tremor: absent  Intention tremor: absent  Action tremor: absent    Reflexes   Reflexes 2+ except as noted.      Result Review :  The following data was reviewed by: Buck Garcia MD on 12/09/2022:  Common labs    Common Labs 1/4/22 1/4/22 6/8/22 6/8/22    1340 1340 1203 1203   Glucose 97  87    BUN 15  19    Creatinine 0.83  0.85    eGFR Non African Am >60  >60    eGFR African Am >60  >60    Sodium 142  137    Potassium 4.4  3.8    Chloride 105  98    Calcium 9.8  9.0    Albumin 4.4  4.1    Total Bilirubin 0.3  0.3    Alkaline Phosphatase 89  86     AST (SGOT) 15  31    ALT (SGPT) 17  42 (A)    WBC  10.63 (A)  14.51 (A)   Hemoglobin  14.0  14.5   Hematocrit  44.1  44.4   Platelets  330  331   (A) Abnormal value       Comments are available for some flowsheets but are not being displayed.                     Assessment and Plan   Diagnoses and all orders for this visit:    1. Trigeminal neuralgia (Primary)  Assessment & Plan:  Continued Emgality, OXC, PGB         Orders:  -     Ambulatory Referral to Pain Management  -     pregabalin (LYRICA) 200 MG capsule; Take 1 capsule by mouth 3 (Three) Times a Day.  Dispense: 270 capsule; Refill: 1  -     OXcarbazepine (TRILEPTAL) 300 MG tablet; Take 3 tablets by mouth 2 (Two) Times a Day.  Dispense: 540 tablet; Refill: 3    2. Periodic headache syndrome, not intractable  Assessment & Plan:  Headaches are unchanged.  Continue current treatment regimen.        Orders:  -     Ambulatory Referral to Pain Management    3. Sjogren's syndrome with central nervous system involvement (HCC)           Follow Up   Return in about 6 months (around 6/9/2023).  Patient was given instructions and counseling regarding her condition or for health maintenance advice. Please see specific information pulled into the AVS if appropriate.

## 2022-12-20 ENCOUNTER — SPECIALTY PHARMACY (OUTPATIENT)
Dept: ONCOLOGY | Facility: HOSPITAL | Age: 42
End: 2022-12-20

## 2022-12-20 NOTE — PROGRESS NOTES
Specialty Pharmacy Refill Coordination Note     Bev is a 42 y.o. female contacted today regarding refills of  Emgality specialty medication(s). Patient is due for injection on 12/29.      Reviewed and verified with patient:       Specialty medication(s) and dose(s) confirmed: yes    Refill Questions    Flowsheet Row Most Recent Value   Changes to allergies? No   Changes to medications? No   New conditions since last clinic visit No   Unplanned office visit, urgent care, ED, or hospital admission in the last 4 weeks  No   How does patient/caregiver feel medication is working? Good   Financial problems or insurance changes  No   Since the previous refill, were any specialty medication doses or scheduled injections missed or delayed?  No   Does this patient require a clinical escalation to a pharmacist? No          Delivery Questions    Flowsheet Row Most Recent Value   Delivery method FedEx   Delivery address correct? Yes   Preferred delivery time? Anytime   Number of medications in delivery 1   Medication being filled and delivered Emgality   Doses left of specialty medications 0   Is there any medication that is due not being filled? No   Supplies needed? No supplies needed   Cooler needed? Yes   Do any medications need mixed or dated? No   Copay form of payment Payment plan already set up   Questions or concerns for the pharmacist? No   Are any medications first time fills? No                 Follow-up: 28 day(s)     Zainab Johnston, Pharmacy Technician  Specialty Pharmacy Technician

## 2023-01-04 ENCOUNTER — OFFICE VISIT (OUTPATIENT)
Dept: PAIN MEDICINE | Facility: CLINIC | Age: 43
End: 2023-01-04
Payer: COMMERCIAL

## 2023-01-04 ENCOUNTER — TELEPHONE (OUTPATIENT)
Dept: PAIN MEDICINE | Facility: CLINIC | Age: 43
End: 2023-01-04

## 2023-01-04 VITALS
SYSTOLIC BLOOD PRESSURE: 144 MMHG | DIASTOLIC BLOOD PRESSURE: 73 MMHG | WEIGHT: 260 LBS | HEART RATE: 102 BPM | TEMPERATURE: 96.2 F | BODY MASS INDEX: 37.22 KG/M2 | OXYGEN SATURATION: 96 % | HEIGHT: 70 IN

## 2023-01-04 DIAGNOSIS — G43.719 INTRACTABLE CHRONIC MIGRAINE WITHOUT AURA AND WITHOUT STATUS MIGRAINOSUS: Primary | ICD-10-CM

## 2023-01-04 PROCEDURE — 99204 OFFICE O/P NEW MOD 45 MIN: CPT | Performed by: STUDENT IN AN ORGANIZED HEALTH CARE EDUCATION/TRAINING PROGRAM

## 2023-01-04 NOTE — TELEPHONE ENCOUNTER
Caller: Bev Ferrari    Relationship: Self    Best call back number:     What was the call regarding: THE PATIENT WOULD LIKE TO KNOW IF BOTOX IS COVERED BY HER INSURANCE.   THE PATIENT WOULD ALSO LIKE TO KNOW WHAT THE CPT CODE IS FOR THE BOTOX    Do you require a callback: YES

## 2023-01-04 NOTE — PROGRESS NOTES
01/04/2023      Referring Physician: Buck Garcia MD  [address]  [address]  [place] [id number]    Primary Physician: Lan Obregon MD    CHIEF COMPLAINT or REASON FOR VISIT: No chief complaint on file.      HISTORY OF PRESENT ILLNESS:  Ms. Bev Ferrari is 42 y.o. female who presents as a new patient referral for evaluation treatment of chronic headaches.  Patient states that her headache started in approximately 2020 without any inciting event or trauma.  She does have a longstanding history of heavy teeth grinding at night, has been instructed to wear a mouthguard however she apparently destroys them by biting through them.  She has daily headaches despite medication treatment with carbamazepine, oxcarbazepine, Lyrica, Emgality.  She does describe photosensitivity however denies aura or prodrome.  She associates her headaches as originating immediately superior to her left ear.  She does have radiating pain occasionally anteriorly and posteriorly, states that her hair hurts her however is wearing a mask today and denies pain from the mask rubbing.    She denies numbness or tingling anywhere.  She did originally present to her primary care physician who was concerned for giant cell arteritis, referred her to rheumatology who investigated serologies and discovered positive Sjogren's test.  Patient denies xerostomia or dry eyes.  She does see a therapist however does not believe they are trained in pain management.      Objective Pain Scoring:   BRIEF PAIN INVENTORY:  Total score:   Pain Score    01/04/23 0749   PainSc:   7   PainLoc: Head  Comment: Neck, Chronic migraine      PHQ-2: PHQ-2 Total Score: 5  PHQ-9: PHQ-9: Brief Depression Severity Measure Score: 18  Opioid Risk Tool:         Review of Systems:   ROS negative except as otherwise noted     Past Medical History:   Past Medical History:   Diagnosis Date   • Anxiety    • Arthritis    • Bowel trouble    • Chronic mental illness    •  Depression    • Fibromyalgia, primary    • H/O seasonal allergies    • Headache    • Headache, tension-type    • Hearing loss    • HPV in female    • Osteoarthritis    • Periodic headache syndrome, not intractable 2022   • Sjogren's syndrome (HCC)    • Trigeminal neuralgia    • Vision loss          Past Surgical History:   Past Surgical History:   Procedure Laterality Date   • CERVICAL BIOPSY     • D & C FIRST TRIMESTER / TX INCOMPLETE / MISSED / SEPTIC / INDUCED   2000   • DENTAL PROCEDURE     • LEEP     • ROOT CANAL           Family History   Family History   Problem Relation Age of Onset   • Dementia Mother    • Cancer Mother    • Skin cancer Mother    • Bipolar disorder Mother    • Arthritis Mother    •  problems Mother    • Mental illness Mother    • Diabetes Father    • Hypertension Father    • Cancer Father    • Prostate cancer Father    • Heart disease Father    • High cholesterol Father    • Obesity Father    • Arthritis Father    • Coronary artery disease Father    • Hyperlipidemia Father    • Depression Sister    • Multiple sclerosis Sister    • Epilepsy Brother    • Breast cancer Maternal Grandmother    • Alcohol abuse Maternal Grandfather    • Mental retardation Brother    • Seizures Brother    • Developmental delay Brother    • Early death Brother    • Multiple sclerosis Sister    • Depression Sister          Social History   Social History     Socioeconomic History   • Marital status: Single   Tobacco Use   • Smoking status: Never   • Smokeless tobacco: Never   • Tobacco comments:     2nd hand exposure to cigarettes and vaping.   Vaping Use   • Vaping Use: Never used   Substance and Sexual Activity   • Alcohol use: Not Currently     Comment: Stopped all alcohol when started on [place] meds.   • Drug use: Never   • Sexual activity: Yes     Partners: Male     Birth control/protection: Other     Comment: Pull-out method.        Medications:     Current Outpatient Medications:   •   baclofen (LIORESAL) 10 MG tablet, Take 10 mg by mouth At Night As Needed., Disp: , Rfl:   •  EPINEPHrine 0.1 MG/0.1ML solution auto-injector, Inject  as directed As Needed., Disp: , Rfl:   •  galcanezumab-gnlm (EMGALITY) 120 MG/ML auto-injector pen, Inject 1 mL under the skin into the appropriate area as directed Every 28 (Twenty-Eight) Days., Disp: 1 mL, Rfl: 11  •  levocetirizine (XYZAL) 5 MG tablet, Take 1 tablet by mouth Every Evening., Disp: , Rfl:   •  loratadine (CLARITIN) 10 MG tablet, Take 10 mg by mouth Daily., Disp: , Rfl:   •  montelukast (SINGULAIR) 10 MG tablet, Take 1 tablet by mouth Every Night., Disp: , Rfl:   •  multivitamin with minerals tablet tablet, , Disp: , Rfl:   •  OXcarbazepine (TRILEPTAL) 300 MG tablet, Take 3 tablets by mouth 2 (Two) Times a Day., Disp: 540 tablet, Rfl: 3  •  pregabalin (LYRICA) 200 MG capsule, Take 1 capsule by mouth 3 (Three) Times a Day., Disp: 270 capsule, Rfl: 1  •  traMADol (ULTRAM) 50 MG tablet, Take 50 mg by mouth At Night As Needed., Disp: , Rfl:         Physical Exam:     Vitals:    01/04/23 0749   BP: 144/73   Pulse: 102   Temp: 96.2 °F (35.7 °C)   SpO2: 96%   Weight: 118 kg (260 lb)   Height: 177.8 cm (70\")   PainSc:   7   PainLoc: Head  Comment: Neck, Chronic migraine        General: Alert and oriented, No acute distress.   HEENT: Normocephalic, atraumatic.   Cardiovascular: No gross edema  Respiratory: Respirations are non-labored    Photophobia  Bilateral V1, V2, V3 negative tenderness to palpation  No tenderness to light touch of left auriculotemporal nerve or posterior auricular  Negative occipital Tinel's  Cervical ROM full without pain  Psychiatric: Cooperative.   Gait: Normal   Assistive Devices:    Imaging Studies:   No results found for this or any previous visit.      Impression & Plan:   Ms. Bev Ferrari is a 42 y.o. female with past medical history significant for fibromyalgia, anxiety, depression, who presents to the pain clinic for evaluation  and treatment of headaches and facial pain.  Clinical examination most consistent with atypical facial pain and migraines/daily headaches.  Patient endorses daily painful headache despite multiple medications.  She indicates that her pain primarily originates from her left temporalis.  I have a low suspicion for neuralgia, higher suspicion for migraines versus tension headache.  Given the frequency of headaches and resistance to medication she is good candidate for Botox injections.  We discussed the relevant risk benefits and alternatives and patient would like to proceed.  Additionally I will refer her for CBT.  We did discuss that often pain relief is not achieved until after the second Botox injection.  1. Intractable chronic migraine without aura and without status migrainosus        PLAN:  1. Medication Recommendations: Continue per neurology    2. Physical Therapy: Continue HEP    3. Psychological: Referral given today for pain psych    4. Complementary and alternative (CAM) Therapies:     5. Labs: None indicated     6. Imaging: None indicated     7. Interventions: Schedule Botox migraine protocol    8. Referrals: None indicated     9. Records requested: n/a    10. Lifestyle goals:    Follow-up 2 months after Botox      [facility]  Leo Owens MD

## 2023-01-05 ENCOUNTER — PATIENT ROUNDING (BHMG ONLY) (OUTPATIENT)
Dept: PAIN MEDICINE | Facility: CLINIC | Age: 43
End: 2023-01-05
Payer: COMMERCIAL

## 2023-01-05 NOTE — PROGRESS NOTES
A MY-CHART MESSAGE HAS BEEN SENT TO THE PATIENT FOR PATIENT ROUNDING WITH [facility] PAIN MANAGEMENT.

## 2023-01-13 ENCOUNTER — OFFICE VISIT (OUTPATIENT)
Dept: PSYCHIATRY | Facility: CLINIC | Age: 43
End: 2023-01-13
Payer: COMMERCIAL

## 2023-01-13 DIAGNOSIS — F43.9 TRAUMA AND STRESSOR-RELATED DISORDER: ICD-10-CM

## 2023-01-13 DIAGNOSIS — Z86.59 HISTORY OF ATTENTION DEFICIT HYPERACTIVITY DISORDER (ADHD): ICD-10-CM

## 2023-01-13 DIAGNOSIS — F41.8 MIXED ANXIETY DEPRESSIVE DISORDER: Primary | ICD-10-CM

## 2023-01-13 PROCEDURE — 90791 PSYCH DIAGNOSTIC EVALUATION: CPT | Performed by: STUDENT IN AN ORGANIZED HEALTH CARE EDUCATION/TRAINING PROGRAM

## 2023-01-17 ENCOUNTER — DOCUMENTATION (OUTPATIENT)
Dept: PAIN MEDICINE | Facility: CLINIC | Age: 43
End: 2023-01-17

## 2023-01-17 ENCOUNTER — OUTSIDE FACILITY SERVICE (OUTPATIENT)
Dept: PAIN MEDICINE | Facility: CLINIC | Age: 43
End: 2023-01-17
Payer: COMMERCIAL

## 2023-01-17 PROCEDURE — 64615 CHEMODENERV MUSC MIGRAINE: CPT | Performed by: STUDENT IN AN ORGANIZED HEALTH CARE EDUCATION/TRAINING PROGRAM

## 2023-01-17 NOTE — PROGRESS NOTES
[facility]  [address] [place] [address]      PROCEDURE: Botox A for migraines via PREEMPT protocol  PRE-OP DIAGNOSIS: Chronic migraines  POST-OP DIAGNOSIS: Chronic migraine    ANTIPLATELET/ANTICOAGULANT STOP DATE: N/A    CONSENT: Risks, benefits and options were explained to the patient, all questions were answered and written informed consent was obtained.    ANESTHESIA: None    PROCEDURE NOTE:  A pre-procedural time out was performed to confirm the correct patient, procedure, side, and site.  Patient was initially placed in the supine position on stretcher.  Injection sites were cleansed with alcohol.  Using a 1 mm TB needle a solution containing 5 units Botox /0.1mL was injected according to the following:      5 units each side   Procerus 5 units midline   Frontalis 10 units each side   Temporalis 20 units each side   Occipitalis 15 units each side   Cervical paraspinal 10 units each side   Trapezius 15 units each side     Lot: [id number] expiration 2025/03  Lot: [id number] expiration 2025/03      COMPLICATIONS: None      Vital signs remained stable throughout the procedure and in the recovery area. There were no immediate complications and the patient tolerated the procedure well. Sensory and motor exam was unchanged from baseline. The patient received written discharge instructions prior to discharge.    FOLLOW UP: As scheduled  ADDITIONAL NOTES:     [facility]    Leo Owens MD

## 2023-01-19 ENCOUNTER — SPECIALTY PHARMACY (OUTPATIENT)
Dept: ONCOLOGY | Facility: HOSPITAL | Age: 43
End: 2023-01-19
Payer: COMMERCIAL

## 2023-01-19 NOTE — PROGRESS NOTES
Specialty Pharmacy Refill Coordination Note     Bev is a 42 y.o. female contacted today regarding refills of  Emgality specialty medication(s). Patient is due for injection on 1/26.      Reviewed and verified with patient:       Specialty medication(s) and dose(s) confirmed: yes    Refill Questions    Flowsheet Row Most Recent Value   Changes to allergies? No   Changes to medications? No   New conditions since last clinic visit No   Unplanned office visit, urgent care, ED, or hospital admission in the last 4 weeks  No   How does patient/caregiver feel medication is working? Very good   Financial problems or insurance changes  No   Since the previous refill, were any specialty medication doses or scheduled injections missed or delayed?  No   Does this patient require a clinical escalation to a pharmacist? No          Delivery Questions    Flowsheet Row Most Recent Value   Delivery method FedEx   Delivery address correct? Yes   Preferred delivery time? Anytime   Number of medications in delivery 1   Medication being filled and delivered Emgality   Doses left of specialty medications 0   Is there any medication that is due not being filled? No   Supplies needed? No supplies needed   Cooler needed? Yes   Do any medications need mixed or dated? No   Copay form of payment Payment plan already set up   Questions or concerns for the pharmacist? No   Are any medications first time fills? No                 Follow-up: 28 day(s)     Zainab Johnston, Pharmacy Technician  Specialty Pharmacy Technician

## 2023-02-16 ENCOUNTER — SPECIALTY PHARMACY (OUTPATIENT)
Dept: ONCOLOGY | Facility: HOSPITAL | Age: 43
End: 2023-02-16
Payer: COMMERCIAL

## 2023-02-16 NOTE — PROGRESS NOTES
Specialty Pharmacy Refill Coordination Note     Bev is a 42 y.o. female contacted today regarding refills of  Emgality specialty medication(s). Patient is due for injection on 2/23.      Reviewed and verified with patient:  Allergies  Meds  Problems       Specialty medication(s) and dose(s) confirmed: yes    Refill Questions    Flowsheet Row Most Recent Value   Changes to allergies? No   Changes to medications? No   New conditions since last clinic visit No   Unplanned office visit, urgent care, ED, or hospital admission in the last 4 weeks  No   How does patient/caregiver feel medication is working? Very good   Financial problems or insurance changes  No   Since the previous refill, were any specialty medication doses or scheduled injections missed or delayed?  No   Does this patient require a clinical escalation to a pharmacist? No          Delivery Questions    Flowsheet Row Most Recent Value   Delivery method FedEx   Delivery address correct? Yes   Preferred delivery time? Anytime   Number of medications in delivery 1   Medication being filled and delivered Emgality   Doses left of specialty medications 0   Is there any medication that is due not being filled? No   Supplies needed? No supplies needed   Cooler needed? Yes   Do any medications need mixed or dated? No   Copay form of payment Payment plan already set up   Questions or concerns for the pharmacist? No   Are any medications first time fills? No                 Follow-up: 28 day(s)     Zainab Johnston, Pharmacy Technician  Specialty Pharmacy Technician

## 2023-02-16 NOTE — PROGRESS NOTES
Specialty Pharmacy Patient Management Program  Neurology Reassessment     Bev Ferrari is a 42 y.o. female with migraines seen by a Neurology provider and enrolled in the Neurology Patient Management program offered by [facility].  A follow-up outreach was conducted, including assessment of continued therapy appropriateness, medication adherence, and side effect incidence and management for  Emgality.     Changes to Insurance Coverage or Financial Support  [employer]     Relevant Past Medical History and Comorbidities  Relevant medical history and concomitant health conditions were discussed with the patient. The patient's chart has been reviewed for relevant past medical history and comorbid health conditions and updated as necessary.   Past Medical History:   Diagnosis Date   • ADHD (attention deficit hyperactivity disorder)    • Anxiety    • Arthritis    • Bowel trouble    • Chronic mental illness    • Chronic pain disorder    • Depression    • Fibromyalgia, primary    • H/O seasonal allergies    • Headache    • Headache, tension-type    • Hearing loss    • HPV in female    • Osteoarthritis    • Periodic headache syndrome, not intractable 08/12/2022   • Psychiatric illness    • PTSD (post-traumatic stress disorder)    • Sjogren's syndrome (HCC)    • Trigeminal neuralgia    • Vision loss      Social History     Socioeconomic History   • Marital status: Single   Tobacco Use   • Smoking status: Never   • Smokeless tobacco: Never   • Tobacco comments:     2nd hand exposure to cigarettes and vaping.   Vaping Use   • Vaping Use: Never used   Substance and Sexual Activity   • Alcohol use: Not Currently     Comment: Stopped all alcohol when started on TN meds.   • Drug use: Never   • Sexual activity: Yes     Partners: Male     Birth control/protection: Other     Comment: Pull-out method.       Problem list reviewed by Dominga Wetzel, PharmD on 2/16/2023 at  2:04 PM    Allergies  Known  allergies and reactions were discussed with the patient. The patient's chart has been reviewed for allergy information and updated as necessary.   Allergies   Allergen Reactions   • Azithromycin Itching   • Molds & Smuts Other (See Comments)   • Pollen Extract Other (See Comments)   • Terbinafine Unknown - Low Severity   • Hydrocodone-Acetaminophen Itching and Rash   • Hydroxychloroquine Rash         Allergies reviewed by Dominga Wetzel, PharmD on 2/16/2023 at  2:04 PM    Relevant Laboratory Values    Common labs    Common Labs 6/8/22 6/8/22    1203 1203   Glucose 87    BUN 19    Creatinine 0.85    eGFR Non  Am >60    eGFR African Am >60    Sodium 137    Potassium 3.8    Chloride 98    Calcium 9.0    Albumin 4.1    Total Bilirubin 0.3    Alkaline Phosphatase 86    AST (SGOT) 31    ALT (SGPT) 42 (A)    WBC  14.51 (A)   Hemoglobin  14.5   Hematocrit  44.4   Platelets  331   (A) Abnormal value       Comments are available for some flowsheets but are not being displayed.               Current Medication List  This medication list has been reviewed with the patient and evaluated for any interactions or necessary modifications/recommendations, and updated to include all prescription medications, OTC medications, and supplements the patient is currently taking.  This list reflects what is contained in the patient's profile, which has also been marked as reviewed to communicate to other providers it is the most up to date version of the patient's current medication therapy.     Current Outpatient Medications:   •  baclofen (LIORESAL) 10 MG tablet, Take 10 mg by mouth At Night As Needed., Disp: , Rfl:   •  EPINEPHrine 0.1 MG/0.1ML solution auto-injector, Inject  as directed As Needed., Disp: , Rfl:   •  galcanezumab-gnlm (EMGALITY) 120 MG/ML auto-injector pen, Inject 1 mL under the skin into the appropriate area as directed Every 28 (Twenty-Eight) Days., Disp: 1 mL, Rfl: 11  •  levocetirizine (XYZAL) 5 MG tablet,  Take 1 tablet by mouth Every Evening., Disp: , Rfl:   •  loratadine (CLARITIN) 10 MG tablet, Take 10 mg by mouth Daily., Disp: , Rfl:   •  montelukast (SINGULAIR) 10 MG tablet, Take 1 tablet by mouth Every Night., Disp: , Rfl:   •  multivitamin with minerals tablet tablet, , Disp: , Rfl:   •  OXcarbazepine (TRILEPTAL) 300 MG tablet, Take 3 tablets by mouth 2 (Two) Times a Day., Disp: 540 tablet, Rfl: 3  •  pregabalin (LYRICA) 200 MG capsule, Take 1 capsule by mouth 3 (Three) Times a Day., Disp: 270 capsule, Rfl: 1  •  traMADol (ULTRAM) 50 MG tablet, Take 50 mg by mouth At Night As Needed., Disp: , Rfl:     Medicines reviewed by Dominga Wetzel, PharmD on 2/16/2023 at  2:04 PM    Drug Interactions  none     Adverse Drug Reactions  • Adverse Reactions Experienced: none   • Plan for ADR Management: not required    Hospitalizations and Urgent Care Since Last Assessment  • Hospitalizations or Admissions: none   • ED Visits: none  • Urgent Office Visits: none     Adherence and Self-Administration  Adherence related patient's specialty therapy was discussed with the patient. The Adherence segment of this outreach has been reviewed and updated.        • Ongoing or New Barriers to Patient Adherence and/or Self-Administration: none   • Methods for Supporting Patient Adherence and/or Self-Administration: patient adept with Emgality self-injections     Goals of Therapy  Goals related to the patient's specialty therapy was discussed with the patient. The Patient Goals segment of this outreach has been reviewed and updated.    Goals     • Specialty Pharmacy General Goal      Maintain adherence of greater than 80%.             Quality of Life Assessment   Quality of Life related to the patient's specialty therapy was discussed with the patient. The QOL segment of this outreach has been reviewed and updated.     Quality of Life Assessment  Quality of Life Improvement Scale: Moderately better    Reassessment Plan &  Follow-Up  1. Medication Therapy Changes: none  2. Additional Plans, Therapy Recommendations, or Therapy Problems to Be Addressed: patient recently referred to pain medicine and had 1st Botox treatment on 1/17/23.  3. Pharmacist to perform regular reassessments no more than (6) months from the previous assessment.  4. Care Coordinator to set up future refill outreaches, coordinate prescription delivery, and escalate clinical questions to pharmacist.     Attestation  I attest that the specialty medication(s) addressed above are appropriate for the patient based on my reassessment.  If the prescribed therapy is at any point deemed not appropriate based on the current or future assessments, a consultation will be initiated with the patient's specialty care provider to determine the best course of action. The revised plan of therapy will be documented along with any additional patient education provided.     Dominga Wetzel, PharmD  2/16/2023  14:06 EST

## 2023-03-17 ENCOUNTER — SPECIALTY PHARMACY (OUTPATIENT)
Dept: ONCOLOGY | Facility: HOSPITAL | Age: 43
End: 2023-03-17
Payer: COMMERCIAL

## 2023-03-17 NOTE — PROGRESS NOTES
Specialty Pharmacy Refill Coordination Note     Bev is a 42 y.o. female contacted today regarding refills of  Emgality specialty medication(s). Patient is due for injection on 3/23.      Reviewed and verified with patient:       Specialty medication(s) and dose(s) confirmed: yes    Refill Questions    Flowsheet Row Most Recent Value   Changes to allergies? No   Changes to medications? No   New conditions since last clinic visit No   Unplanned office visit, urgent care, ED, or hospital admission in the last 4 weeks  No   How does patient/caregiver feel medication is working? Very good   Financial problems or insurance changes  No   Since the previous refill, were any specialty medication doses or scheduled injections missed or delayed?  No   Does this patient require a clinical escalation to a pharmacist? No          Delivery Questions    Flowsheet Row Most Recent Value   Delivery method FedEx   Delivery address correct? Yes   Preferred delivery time? Anytime   Number of medications in delivery 1   Medication being filled and delivered Emgality   Doses left of specialty medications 0   Is there any medication that is due not being filled? No   Supplies needed? No supplies needed   Cooler needed? Yes   Do any medications need mixed or dated? No   Copay form of payment Payment plan already set up   Questions or concerns for the pharmacist? No   Are any medications first time fills? No                 Follow-up: 28 day(s)     Zainab Johnston, Pharmacy Technician  Specialty Pharmacy Technician

## 2023-03-30 ENCOUNTER — OFFICE VISIT (OUTPATIENT)
Dept: PAIN MEDICINE | Facility: CLINIC | Age: 43
End: 2023-03-30
Payer: COMMERCIAL

## 2023-03-30 VITALS
DIASTOLIC BLOOD PRESSURE: 76 MMHG | OXYGEN SATURATION: 97 % | TEMPERATURE: 96.8 F | RESPIRATION RATE: 14 BRPM | SYSTOLIC BLOOD PRESSURE: 124 MMHG | HEART RATE: 98 BPM | HEIGHT: 70 IN | BODY MASS INDEX: 37.42 KG/M2 | WEIGHT: 261.4 LBS

## 2023-03-30 DIAGNOSIS — M79.7 FIBROMYALGIA: ICD-10-CM

## 2023-03-30 DIAGNOSIS — G43.719 INTRACTABLE CHRONIC MIGRAINE WITHOUT AURA AND WITHOUT STATUS MIGRAINOSUS: Primary | ICD-10-CM

## 2023-03-30 PROCEDURE — 99213 OFFICE O/P EST LOW 20 MIN: CPT | Performed by: STUDENT IN AN ORGANIZED HEALTH CARE EDUCATION/TRAINING PROGRAM

## 2023-03-30 NOTE — PROGRESS NOTES
Primary Physician: Lan Obregon MD    CHIEF COMPLAINT or REASON FOR VISIT: Follow-up and Migraine      Initial HPI 1/4/23:  Ms. Bev Ferrari is 42 y.o. female who presents as a new patient referral for evaluation treatment of chronic headaches.  Patient states that her headache started in approximately 2020 without any inciting event or trauma.  She does have a longstanding history of heavy teeth grinding at night, has been instructed to wear a mouthguard however she apparently destroys them by biting through them.  She has daily headaches despite medication treatment with carbamazepine, oxcarbazepine, Lyrica, Emgality.  She does describe photosensitivity however denies aura or prodrome.  She associates her headaches as originating immediately superior to her left ear.  She does have radiating pain occasionally anteriorly and posteriorly, states that her hair hurts her however is wearing a mask today and denies pain from the mask rubbing.    She denies numbness or tingling anywhere.  She did originally present to her primary care physician who was concerned for giant cell arteritis, referred her to rheumatology who investigated serologies and discovered positive Sjogren's test.  Patient denies xerostomia or dry eyes.  She does see a therapist however does not believe they are trained in pain management.    Interval history: Patient returns after her first Botox protocol without any change in the frequency or intensity of her headaches.  Has started to see pain psychology which has been helpful with coping strategies.    1/17/23: Botox 155 units with continued daily headache    Objective Pain Scoring:   BRIEF PAIN INVENTORY:  Total score:   Pain Score    03/30/23 0815   PainSc:   7   PainLoc: Neck      PHQ-2: PHQ-2 Total Score: 4  PHQ-9: PHQ-9: Brief Depression Severity Measure Score: 16  Opioid Risk Tool:         Review of Systems:   ROS negative except as otherwise noted     Past Medical History:   Past  Medical History:   Diagnosis Date   • ADHD (attention deficit hyperactivity disorder)    • Anxiety    • Arthritis    • Bowel trouble    • Chronic mental illness    • Chronic pain disorder    • Depression    • Extremity pain    • Fibromyalgia, primary    • H/O seasonal allergies    • Headache    • Headache, tension-type    • Hearing loss    • HPV in female    • Joint pain    • Low back pain    • Lumbosacral disc disease    • Neck pain    • Osteoarthritis    • Periodic headache syndrome, not intractable 2022   • Psychiatric illness    • PTSD (post-traumatic stress disorder)    • Sjogren's syndrome (HCC)    • Trigeminal neuralgia    • Vision loss          Past Surgical History:   Past Surgical History:   Procedure Laterality Date   • CERVICAL BIOPSY     • D & C FIRST TRIMESTER / TX INCOMPLETE / MISSED / SEPTIC / INDUCED   2000   • DENTAL PROCEDURE     • LEEP     • ROOT CANAL           Family History   Family History   Problem Relation Age of Onset   • Dementia Mother    • Cancer Mother    • Skin cancer Mother    • Bipolar disorder Mother    • Arthritis Mother    •  problems Mother    • Mental illness Mother    • Diabetes Father    • Hypertension Father    • Cancer Father    • Prostate cancer Father    • Heart disease Father    • High cholesterol Father    • Obesity Father    • Arthritis Father    • Coronary artery disease Father    • Hyperlipidemia Father    • Depression Sister    • Multiple sclerosis Sister    • Epilepsy Brother    • Breast cancer Maternal Grandmother    • Alcohol abuse Maternal Grandfather    • Mental retardation Brother    • Seizures Brother    • Developmental delay Brother    • Early death Brother    • Multiple sclerosis Sister    • Depression Sister          Social History   Social History     Socioeconomic History   • Marital status: Single   Tobacco Use   • Smoking status: Never   • Smokeless tobacco: Never   • Tobacco comments:     2nd hand exposure to cigarettes and  "vaping.   Vaping Use   • Vaping Use: Never used   Substance and Sexual Activity   • Alcohol use: Not Currently     Comment: Stopped all alcohol when started on TN meds.   • Drug use: Never   • Sexual activity: Yes     Partners: Male     Birth control/protection: Other     Comment: Pull-out method.        Medications:     Current Outpatient Medications:   •  baclofen (LIORESAL) 10 MG tablet, Take 1 tablet by mouth At Night As Needed., Disp: , Rfl:   •  EPINEPHrine 0.1 MG/0.1ML solution auto-injector, Inject  as directed As Needed., Disp: , Rfl:   •  galcanezumab-gnlm (EMGALITY) 120 MG/ML auto-injector pen, Inject 1 mL under the skin into the appropriate area as directed Every 28 (Twenty-Eight) Days., Disp: 1 mL, Rfl: 11  •  levocetirizine (XYZAL) 5 MG tablet, Take 1 tablet by mouth Every Evening., Disp: , Rfl:   •  loratadine (CLARITIN) 10 MG tablet, Take 1 tablet by mouth Daily., Disp: , Rfl:   •  montelukast (SINGULAIR) 10 MG tablet, Take 1 tablet by mouth Every Night., Disp: , Rfl:   •  multivitamin with minerals tablet tablet, , Disp: , Rfl:   •  OXcarbazepine (TRILEPTAL) 300 MG tablet, Take 3 tablets by mouth 2 (Two) Times a Day., Disp: 540 tablet, Rfl: 3  •  pregabalin (LYRICA) 200 MG capsule, Take 1 capsule by mouth 3 (Three) Times a Day., Disp: 270 capsule, Rfl: 1  •  traMADol (ULTRAM) 50 MG tablet, Take 1 tablet by mouth At Night As Needed., Disp: , Rfl:         Physical Exam:     Vitals:    03/30/23 0815   BP: 124/76   BP Location: Left arm   Patient Position: Sitting   Cuff Size: Adult   Pulse: 98   Resp: 14   Temp: 96.8 °F (36 °C)   TempSrc: Infrared   SpO2: 97%   Weight: 119 kg (261 lb 6.4 oz)   Height: 177.8 cm (70\")   PainSc:   7   PainLoc: Neck        General: Alert and oriented, No acute distress.   HEENT: Normocephalic, atraumatic.   Cardiovascular: No gross edema  Respiratory: Respirations are non-labored    Photophobia  Bilateral V1, V2, V3 negative tenderness to palpation  No tenderness to light " touch of left auriculotemporal nerve or posterior auricular  Negative occipital Tinel's  Cervical ROM full without pain  Psychiatric: Cooperative.   Gait: Normal   Assistive Devices:    Imaging Studies:   No results found for this or any previous visit.      Impression & Plan:   1/4/23:  Bev Ferrari is a 42 y.o. female with past medical history significant for fibromyalgia, anxiety, depression, who presents to the pain clinic for evaluation and treatment of headaches and facial pain.  Clinical examination most consistent with atypical facial pain and migraines/daily headaches.  Patient endorses daily painful headache despite multiple medications.  She indicates that her pain primarily originates from her left temporalis.  I have a low suspicion for neuralgia, higher suspicion for migraines versus tension headache.  Given the frequency of headaches and resistance to medication she is good candidate for Botox injections.  We discussed the relevant risk benefits and alternatives and patient would like to proceed.  Additionally I will refer her for CBT.  We did discuss that often pain relief is not achieved until after the second Botox injection.  3/30/23: No benefit after first Botox.  We will proceed with second and if no benefit will discontinue.  Fibromyalgia may be headache etiology as well.    1. Intractable chronic migraine without aura and without status migrainosus        PLAN:  1. Medication Recommendations: Continue per neurology    2. Physical Therapy: Continue HEP    3. Psychological: Continue with pain psychology    4. Complementary and alternative (CAM) Therapies:     5. Labs: None indicated     6. Imaging: None indicated     7. Interventions: Repeat Botox migraine protocol    8. Referrals: None indicated     9. Records requested: n/a    10. Lifestyle goals:    Follow-up 2 months after Botox    [facility]  Leo Owens MD

## 2023-03-31 ENCOUNTER — TELEMEDICINE (OUTPATIENT)
Dept: PSYCHIATRY | Facility: CLINIC | Age: 43
End: 2023-03-31
Payer: COMMERCIAL

## 2023-03-31 DIAGNOSIS — Z86.59 HISTORY OF ATTENTION DEFICIT HYPERACTIVITY DISORDER (ADHD): ICD-10-CM

## 2023-03-31 DIAGNOSIS — F43.23 ADJUSTMENT DISORDER WITH MIXED ANXIETY AND DEPRESSED MOOD: Primary | ICD-10-CM

## 2023-04-13 ENCOUNTER — SPECIALTY PHARMACY (OUTPATIENT)
Dept: ONCOLOGY | Facility: HOSPITAL | Age: 43
End: 2023-04-13
Payer: COMMERCIAL

## 2023-04-13 NOTE — PROGRESS NOTES
Specialty Pharmacy Refill Coordination Note     Bev is a 42 y.o. female contacted today regarding refills of  Emgality specialty medication(s). Patient is due for injection on 4/20.      Reviewed and verified with patient:       Specialty medication(s) and dose(s) confirmed: yes    Refill Questions    Flowsheet Row Most Recent Value   Changes to allergies? No   Changes to medications? No   New conditions since last clinic visit No   Unplanned office visit, urgent care, ED, or hospital admission in the last 4 weeks  No   How does patient/caregiver feel medication is working? Very good   Financial problems or insurance changes  No   Since the previous refill, were any specialty medication doses or scheduled injections missed or delayed?  No   Does this patient require a clinical escalation to a pharmacist? No          Delivery Questions    Flowsheet Row Most Recent Value   Delivery method FedEx   Delivery address correct? Yes   Preferred delivery time? Anytime   Number of medications in delivery 1   Medication being filled and delivered Emgality   Doses left of specialty medications 0   Is there any medication that is due not being filled? No   Supplies needed? No supplies needed   Cooler needed? Yes   Do any medications need mixed or dated? No   Copay form of payment Payment plan already set up   Questions or concerns for the pharmacist? No   Are any medications first time fills? No                 Follow-up: 28 day(s)     Zainab Johnston, Pharmacy Technician  Specialty Pharmacy Technician

## 2023-05-02 ENCOUNTER — DOCUMENTATION (OUTPATIENT)
Dept: PAIN MEDICINE | Facility: CLINIC | Age: 43
End: 2023-05-02

## 2023-05-02 ENCOUNTER — OUTSIDE FACILITY SERVICE (OUTPATIENT)
Dept: PAIN MEDICINE | Facility: CLINIC | Age: 43
End: 2023-05-02
Payer: COMMERCIAL

## 2023-05-02 NOTE — PROGRESS NOTES
[facility]  [address] [place] [address]      PROCEDURE: Botox A for migraines via PREEMPT protocol  PRE-OP DIAGNOSIS: Chronic migraines  POST-OP DIAGNOSIS: Chronic migraine    ANTIPLATELET/ANTICOAGULANT STOP DATE: N/A    CONSENT: Risks, benefits and options were explained to the patient, all questions were answered and written informed consent was obtained.    ANESTHESIA: None    PROCEDURE NOTE:  A pre-procedural time out was performed to confirm the correct patient, procedure, side, and site.  Patient was initially placed in the supine position on stretcher.  Injection sites were cleansed with alcohol.  Using a 1 mm TB needle a solution containing 5 units Botox /0.1mL was injected according to the following:      5 units each side   Procerus 5 units midline   Frontalis 10 units each side   Temporalis 20 units each side   Occipitalis 15 units each side   Cervical paraspinal 10 units each side   Trapezius 15 units each side     Lot: [id number] expiration 2025/04  Lot: [id number] expiration 2025/04      COMPLICATIONS: None      Vital signs remained stable throughout the procedure and in the recovery area. There were no immediate complications and the patient tolerated the procedure well. Sensory and motor exam was unchanged from baseline. The patient received written discharge instructions prior to discharge.    FOLLOW UP: As scheduled    ADDITIONAL NOTES:     [facility]    Leo Owens MD

## 2023-05-11 ENCOUNTER — SPECIALTY PHARMACY (OUTPATIENT)
Dept: ONCOLOGY | Facility: HOSPITAL | Age: 43
End: 2023-05-11
Payer: COMMERCIAL

## 2023-05-11 NOTE — PROGRESS NOTES
Specialty Pharmacy Refill Coordination Note     Bev is a 42 y.o. female contacted today regarding refills of  Emgality specialty medication(s). Patient is due for injection on 5/18.       Reviewed and verified with patient:       Specialty medication(s) and dose(s) confirmed: yes    Refill Questions    Flowsheet Row Most Recent Value   Changes to allergies? No   Changes to medications? Yes  [Patient is taking Botox from pain management]   New conditions since last clinic visit No   Unplanned office visit, urgent care, ED, or hospital admission in the last 4 weeks  No   How does patient/caregiver feel medication is working? Very good   Financial problems or insurance changes  No   Since the previous refill, were any specialty medication doses or scheduled injections missed or delayed?  No   Does this patient require a clinical escalation to a pharmacist? Yes          Delivery Questions    Flowsheet Row Most Recent Value   Delivery method FedEx   Delivery address correct? Yes   Preferred delivery time? Anytime   Number of medications in delivery 1   Medication being filled and delivered Emgality   Doses left of specialty medications 0   Is there any medication that is due not being filled? No   Supplies needed? No supplies needed   Cooler needed? Yes   Do any medications need mixed or dated? No   Copay form of payment Payment plan already set up   Questions or concerns for the pharmacist? No   Are any medications first time fills? No                 Follow-up: 28 day(s)     Zainab Johnston, Pharmacy Technician  Specialty Pharmacy Technician

## 2023-05-12 RX ORDER — ONABOTULINUMTOXINA 200 [USP'U]/1
INJECTION, POWDER, LYOPHILIZED, FOR SOLUTION INTRADERMAL; INTRAMUSCULAR ONCE
COMMUNITY

## 2023-05-22 NOTE — PROGRESS NOTES
"     Video Visit      Patient Name: Bev Ferrari  : 1980   MRN: [id number]   Start: 9:02  Stop: 9:59  Referring Physician: Lan Obregon MD    Chief Complaint:      ICD-10-CM ICD-9-CM   1. Adjustment disorder with mixed anxiety and depressed mood  F43.23 309.28   2. History of attention deficit hyperactivity disorder (ADHD)  Z86.59 V11.8        This provider is located at [facility], using a secure MyChart Video Visit through [employer]. Bev Ferrari is being seen remotely via telehealth at their home address in [place], and stated they are in a secure environment for this session. The patient's condition being diagnosed/treated is appropriate for telemedicine. I Kayla Jackson, PhD identified myself as well as my credentials.   The patient, and/or patients guardian, consent to be seen remotely, and when consent is given they understand that the consent allows for patient identifiable information to be sent to a third party as needed.   They may refuse to be seen remotely at any time. The electronic data is encrypted and password protected, and the patient and/or guardian has been advised of the potential risks to privacy not withstanding such measures.     You have chosen to receive care through a telehealth visit.  Do you consent to use a video/audio connection for your medical care today? YES.    History of Present Illness: Bev Ferrari is a 43 y.o. female who I spoke with on video visit today for follow-up consultation and supportive psychotherapy. Ms. Ferrari was referred to this provider by Dr. Owens due to her history of migraines and chronic pain. Records reviewed from a pain management consultation on 23 indicates that, \"her headache started in approximately  without any inciting event or trauma.  She does have a longstanding history of heavy teeth grinding at night, has been instructed to wear a mouthguard however she apparently " "destroys them by biting through them.  She has daily headaches despite medication treatment with carbamazepine, oxcarbazepine, Lyrica, Emgality.  She does describe photosensitivity however denies aura or prodrome.  She associates her headaches as originating immediately superior to her left ear.  She does have radiating pain occasionally anteriorly and posteriorly, states that her hair hurts her however is wearing a mask today and denies pain from the mask rubbing. She denies numbness or tingling anywhere.  She did originally present to her primary care physician who was concerned for giant cell arteritis, referred her to rheumatology who investigated serologies and discovered positive Sjogren's test.  Patient denies xerostomia or dry eyes\".     Past Psychiatric History:   Ms. Ferrari reports prior psychiatric psychiatric diagnoses of, \"Major Depressive Disorder, Recurrent, moderate; PTSD; ADHD, combined type; and Generalized Anxiety Disorder\". She states a history of treatment off-and-on since age 12. Currently, she attends weekly psychotherapy appointments via telemed with Keeley Hernandez LCSW of [facility] - for the past 2.5 years.      Ms. Ferrari indicates current problems with anxiety, depression, stress, sleep difficulties, pain problems, difficulties with focus/   concentration/memory, fatigue, anger/ frustration, relationship issues, feelings of helplessness/hopelessness/worthlessness, low self-esteem, limited familial support, side effects from medication, difficulty coping, fears about surgery, and weight gain.     Subjective   Review of Systems:   Review of Systems    Patient History:   The following portions of the patient's history were reviewed and updated as appropriate: allergies, current medications, past family history, past medical history, past social history, past surgical history and problem list.     Social:  Bev reported being less stressed following her Botox injections with pain " management yesterday. She noted that knowing what to expect and having a plan prior to her appointment was helpful.    Medications:     Current Outpatient Medications:     baclofen (LIORESAL) 10 MG tablet, Take 1 tablet by mouth At Night As Needed., Disp: , Rfl:     EPINEPHrine 0.1 MG/0.1ML solution auto-injector, Inject  as directed As Needed., Disp: , Rfl:     estradiol (ESTRACE) 0.1 MG/GM vaginal cream, Insert 2 g into the vagina 2 (Two) Times a Day., Disp: , Rfl:     galcanezumab-gnlm (EMGALITY) 120 MG/ML auto-injector pen, Inject 1 mL under the skin into the appropriate area as directed Every 28 (Twenty-Eight) Days., Disp: 1 mL, Rfl: 11    levocetirizine (XYZAL) 5 MG tablet, Take 1 tablet by mouth Every Evening., Disp: , Rfl:     loratadine (CLARITIN) 10 MG tablet, Take 1 tablet by mouth Daily., Disp: , Rfl:     montelukast (SINGULAIR) 10 MG tablet, Take 1 tablet by mouth Every Night., Disp: , Rfl:     multivitamin with minerals tablet tablet, , Disp: , Rfl:     OnabotulinumtoxinA 200 units reconstituted solution, . PHYSICIAN TO INJECT 155 UNITS INTRAMUSCULARLY INTO HEAD, NECK AND SHOULDERS EVERY 12 WEEKS Per FDA PROTOCOL, Disp: 1 each, Rfl: 3    OXcarbazepine (TRILEPTAL) 300 MG tablet, Take 3 tablets by mouth 2 (Two) Times a Day., Disp: 540 tablet, Rfl: 3    pregabalin (LYRICA) 200 MG capsule, Take 1 capsule by mouth 3 (Three) Times a Day., Disp: 270 capsule, Rfl: 1    Ryaltris 665-25 MCG/ACT suspension, SPRAY 1 SPRAY TWICE A DAY BY INTRANASAL ROUTE., Disp: , Rfl:     traMADol (ULTRAM) 50 MG tablet, Take 1 tablet by mouth At Night As Needed., Disp: , Rfl:     Objective     Mental Status Exam:   Appearance: Normal  Hygiene: Normal  Cooperation: Cooperative   Eye Contact: Fleeting  Psychomotor Behavior: Normal   Mood: Mildly Annoyed  Affect: Appropriate  Speech: Clear  Thought Process: Normal  Thought Content: Congruent to mood  Suicidal: No SI indicated  Homicidal: No HI  indicated  Hallucinations: None  Delusion: None  Memory: Intact  Orientation: Orientated x3  Reliability: Good  Insight: Fair to Good  Judgement: Good  Impulse Control: Influenced by pain    Assessment / Plan      Visit Diagnosis/Orders Placed This Visit:  Diagnoses and all orders for this visit:    1. Adjustment disorder with mixed anxiety and depressed mood (Primary)    2. History of attention deficit hyperactivity disorder (ADHD)    Allowed the patient time to share her experience and validated concerns related to getting Botox injections. Despite no benefit noted from her last injection, she appeared less distressed given that Dr. Owens had informed her that it may take more than one injection to be effective. Discussed the importance of playing an active role in treatment and reiterated her need for a holistic approach to pain management.     Continued CBT. Analyzed recent scenarios in her life and how challenging negative thinking, having less rigid views, and not responding over emotionally to distress were helpful. She also noted less conflict with her partner by doing so.     Differential:   Major Depressive Disorder  Generalized Anxiety Disorder    Plan:   Continue CBT. The patient acknowledged and verbally consented to continue with current treatment plan and was educated on the importance of compliance with treatment and follow-up appointments. The patient seems reasonably able to adhere to treatment plan.      Safety Considerations:  Patient is agreeable to call the office with any worsening of symptoms or onset of side effects. Patient is agreeable to call [phone], or go to the nearest ER should she begin having SI/HI.    Quality Measures:   Never smoker    I advised Bev Ferrari of the risks of tobacco use.     Follow Up:   Return in about 2 weeks (around 4/14/2023) for Counseling Session.      Kayla Jackson, PhD, Temp Licensed Psychologist

## 2023-06-05 ENCOUNTER — SPECIALTY PHARMACY (OUTPATIENT)
Dept: ONCOLOGY | Facility: HOSPITAL | Age: 43
End: 2023-06-05
Payer: COMMERCIAL

## 2023-06-05 NOTE — PROGRESS NOTES
Specialty Pharmacy Refill Coordination Note     Bev is a 42 y.o. female contacted today regarding refills of  Emgality specialty medication(s). Patient took last injection of Emgality on 5/18/23, and due for next injection on 6/15/23.    Reviewed and verified with patient:       Specialty medication(s) and dose(s) confirmed: yes    Refill Questions      Flowsheet Row Most Recent Value   Changes to allergies? No   Changes to medications? Yes  [New Med- Estradiol 0.01% cream twice weekly]   New conditions since last clinic visit No   Unplanned office visit, urgent care, ED, or hospital admission in the last 4 weeks  No   How does patient/caregiver feel medication is working? Very good   Financial problems or insurance changes  No   Since the previous refill, were any specialty medication doses or scheduled injections missed or delayed?  No   Does this patient require a clinical escalation to a pharmacist? Yes  [New Med- Estradiol 0.01% cream twice weekly]            Delivery Questions      Flowsheet Row Most Recent Value   Delivery method FedEx   Delivery address correct? Yes   Preferred delivery time? Anytime   Number of medications in delivery 1   Medication being filled and delivered Emgality   Doses left of specialty medications 0   Is there any medication that is due not being filled? No   Supplies needed? No supplies needed   Cooler needed? Yes   Do any medications need mixed or dated? No   Copay form of payment Credit card on file   Additional comments N/A   Questions or concerns for the pharmacist? No   Explain any questions or concerns for the pharmacist N/A   Are any medications first time fills? No                   Follow-up: 28 day(s)     Rose Lai, Pharmacy Technician  Specialty Pharmacy Technician

## 2023-06-06 RX ORDER — ESTRADIOL 0.1 MG/G
2 CREAM VAGINAL 2 TIMES DAILY
COMMUNITY

## 2023-06-09 ENCOUNTER — OFFICE VISIT (OUTPATIENT)
Dept: NEUROLOGY | Facility: CLINIC | Age: 43
End: 2023-06-09
Payer: COMMERCIAL

## 2023-06-09 VITALS
SYSTOLIC BLOOD PRESSURE: 118 MMHG | HEIGHT: 70 IN | BODY MASS INDEX: 37.82 KG/M2 | WEIGHT: 264.2 LBS | OXYGEN SATURATION: 94 % | HEART RATE: 96 BPM | DIASTOLIC BLOOD PRESSURE: 76 MMHG

## 2023-06-09 DIAGNOSIS — G50.0 TRIGEMINAL NEURALGIA: Chronic | ICD-10-CM

## 2023-06-09 DIAGNOSIS — G43.709 CHRONIC MIGRAINE WITHOUT AURA WITHOUT STATUS MIGRAINOSUS, NOT INTRACTABLE: Primary | ICD-10-CM

## 2023-06-09 PROCEDURE — 99214 OFFICE O/P EST MOD 30 MIN: CPT | Performed by: PSYCHIATRY & NEUROLOGY

## 2023-06-09 RX ORDER — PREGABALIN 200 MG/1
200 CAPSULE ORAL 3 TIMES DAILY
Qty: 270 CAPSULE | Refills: 1 | Status: SHIPPED | OUTPATIENT
Start: 2023-06-09

## 2023-06-09 RX ORDER — OLOPATADINE HYDROCHLORIDE AND MOMETASONE FUROATE 25; 665 UG/1; UG/1
SPRAY, METERED NASAL
COMMUNITY
Start: 2023-05-15

## 2023-06-09 RX ORDER — CLONIDINE HYDROCHLORIDE 0.1 MG/1
TABLET ORAL
COMMUNITY
End: 2023-06-09

## 2023-06-09 NOTE — PROGRESS NOTES
"Chief Complaint  Trigeminal Neuralgia    Subjective        Bev Ferrari presents to [facility]    History of Present Illness    42 y.o. female returns in follow up.  Last visit on 12/9/22 continued OXC. PGB, Emgality.  .        Started BTX by pain management.  Last injection 5/2/23     Intensity 6 - 8/10.       mg TID      mg TID     Occurs daily.  Located over left side.       Decreased breakthrough pain .       Break through pain in afternoon.  Worsens with weather changes.       Dull ache on left side of head.  Does not like hair touching left side of face. Switches sides.  Mild to moderate intensity.       Problem history:     Pain started over left ear last March 2021.  Constant.  Intensity 6/10.       Progressed to sharp electrical shocks into face.  Touch and cold provoked shocks.   Tx with muscle relaxers and steroids w/o benefit.      Left face sensitive to touch.  Cold and weather changes increases discomfort.       Lyrica decreases pain by a moderate amount.       OXC reduced pain but had increased bruising.      Started on right side but is minor.       Reviewed medical records:     Sx of left facial pain and weakness.  Sx onset months ago.  Sx occur weekly.  Concurrent Sjogren's.     Recent onset of right sided facial pain.       Meds:  Tramadol, Lyrica, baclofen, TPM, OXC, CBZ, TCAD      Labs - 1/4/22 CBC, CMP - [facility]      HCT - normal     MRI Brain - [facility]     Objective   Vital Signs:  /76   Pulse 96   Ht 177.8 cm (70\")   Wt 120 kg (264 lb 3.2 oz)   SpO2 94%   BMI 37.91 kg/m²   Estimated body mass index is 37.91 kg/m² as calculated from the following:    Height as of this encounter: 177.8 cm (70\").    Weight as of this encounter: 120 kg (264 lb 3.2 oz).             Physical Exam  Eyes:      Extraocular Movements: EOM normal.      Pupils: Pupils are equal, round, and reactive to light.   Neurological:      Mental Status: She is oriented to " person, place, and time.      Cranial Nerves: Cranial nerves 2-12 are intact.      Motor: Motor strength is normal.      Gait: Gait is intact.   Psychiatric:         Speech: Speech normal.        Neurologic Exam     Mental Status   Oriented to person, place, and time.   Speech: speech is normal   Level of consciousness: alert  Knowledge: good and consistent with education.   Normal comprehension.     Cranial Nerves   Cranial nerves II through XII intact.     CN II   Visual fields full to confrontation.   Visual acuity: normal  Right visual field deficit: none  Left visual field deficit: none     CN III, IV, VI   Pupils are equal, round, and reactive to light.  Extraocular motions are normal.   Nystagmus: none   Diplopia: none  Ophthalmoparesis: none  Upgaze: normal  Downgaze: normal  Conjugate gaze: present    CN V   Facial sensation intact.   Right corneal reflex: normal  Left corneal reflex: normal    CN VII   Right facial weakness: none  Left facial weakness: none    CN VIII   Hearing: intact    CN IX, X   Palate: symmetric  Right gag reflex: normal  Left gag reflex: normal    CN XI   Right sternocleidomastoid strength: normal  Left sternocleidomastoid strength: normal    CN XII   Tongue: not atrophic  Fasciculations: absent  Tongue deviation: none    Motor Exam   Muscle bulk: normal  Overall muscle tone: normal    Strength   Strength 5/5 throughout.     Sensory Exam   Light touch normal.     Gait, Coordination, and Reflexes     Gait  Gait: normal    Tremor   Resting tremor: absent  Intention tremor: absent  Action tremor: absent    Reflexes   Reflexes 2+ except as noted.    Result Review :  The following data was reviewed by: Buck Garcia MD on 06/09/2023:                 Assessment and Plan   Diagnoses and all orders for this visit:    1. Chronic migraine without aura without status migrainosus, not intractable (Primary)  Assessment & Plan:  Headaches are unchanged.  Continue current treatment  regimen.    Lyrica, OXC, Emgality    May switch  units           2. Trigeminal neuralgia  Assessment & Plan:  Continue Lyrica, OXC     Orders:  -     pregabalin (LYRICA) 200 MG capsule; Take 1 capsule by mouth 3 (Three) Times a Day.  Dispense: 270 capsule; Refill: 1             Follow Up   No follow-ups on file.  Patient was given instructions and counseling regarding her condition or for health maintenance advice. Please see specific information pulled into the AVS if appropriate.

## 2023-06-09 NOTE — ASSESSMENT & PLAN NOTE
Headaches are unchanged.  Continue current treatment regimen.    Lyrica, OXC, Emgality    May switch  units

## 2023-06-19 ENCOUNTER — OFFICE VISIT (OUTPATIENT)
Dept: PAIN MEDICINE | Facility: CLINIC | Age: 43
End: 2023-06-19
Payer: COMMERCIAL

## 2023-06-19 VITALS
HEIGHT: 70 IN | WEIGHT: 258 LBS | HEART RATE: 110 BPM | TEMPERATURE: 98.1 F | SYSTOLIC BLOOD PRESSURE: 140 MMHG | OXYGEN SATURATION: 99 % | BODY MASS INDEX: 36.94 KG/M2 | DIASTOLIC BLOOD PRESSURE: 70 MMHG | RESPIRATION RATE: 16 BRPM

## 2023-06-19 DIAGNOSIS — M79.7 FIBROMYALGIA: Primary | ICD-10-CM

## 2023-06-19 PROCEDURE — 99214 OFFICE O/P EST MOD 30 MIN: CPT | Performed by: STUDENT IN AN ORGANIZED HEALTH CARE EDUCATION/TRAINING PROGRAM

## 2023-06-19 NOTE — PROGRESS NOTES
Primary Physician: Lan Obregon MD    CHIEF COMPLAINT or REASON FOR VISIT: No chief complaint on file.      Initial HPI 1/4/23:  Ms. Bev Ferrari is 42 y.o. female who presents as a new patient referral for evaluation treatment of chronic headaches.  Patient states that her headache started in approximately 2020 without any inciting event or trauma.  She does have a longstanding history of heavy teeth grinding at night, has been instructed to wear a mouthguard however she apparently destroys them by biting through them.  She has daily headaches despite medication treatment with carbamazepine, oxcarbazepine, Lyrica, Emgality.  She does describe photosensitivity however denies aura or prodrome.  She associates her headaches as originating immediately superior to her left ear.  She does have radiating pain occasionally anteriorly and posteriorly, states that her hair hurts her however is wearing a mask today and denies pain from the mask rubbing.    She denies numbness or tingling anywhere.  She did originally present to her primary care physician who was concerned for giant cell arteritis, referred her to rheumatology who investigated serologies and discovered positive Sjogren's test.  Patient denies xerostomia or dry eyes.  She does see a therapist however does not believe they are trained in pain management.    Interval history: Patient returns after her second Botox protocol without any change in the frequency or intensity of her headaches.  She does feel that it is improved her photophobia.  She is unsure whether the intensity is any better.  She is frustrated with the lack of an definitive diagnosis for her pain.    1/17/23: Botox 155 units with continued daily headache  5/2/2023: Botox 155 units with continued daily headache    Objective Pain Scoring:   BRIEF PAIN INVENTORY:  Total score:   Pain Score    06/19/23 1241   PainSc:   6   PainLoc: Head      PHQ-2: PHQ-2 Total Score: 2  PHQ-9: PHQ-9: Brief  Depression Severity Measure Score: 10  Opioid Risk Tool:         Review of Systems:   ROS negative except as otherwise noted     Past Medical History:   Past Medical History:   Diagnosis Date    ADHD (attention deficit hyperactivity disorder)     Anxiety     Arthritis     Bowel trouble     Chronic mental illness     Chronic pain disorder     CTS (carpal tunnel syndrome)     Depression     Extremity pain     Fibromyalgia, primary     H/O seasonal allergies     Headache     Headache, tension-type     Hearing loss     HPV in female     Joint pain     Low back pain     Lumbosacral disc disease     Neck pain     Osteoarthritis     Periodic headache syndrome, not intractable 2022    Psychiatric illness     PTSD (post-traumatic stress disorder)     Sjogren's syndrome     Trigeminal neuralgia     Vision loss          Past Surgical History:   Past Surgical History:   Procedure Laterality Date    CERVICAL BIOPSY      D & C FIRST TRIMESTER / TX INCOMPLETE / MISSED / SEPTIC / INDUCED   2000    DENTAL PROCEDURE      LEEP      ROOT CANAL           Family History   Family History   Problem Relation Age of Onset    Dementia Mother     Cancer Mother     Skin cancer Mother     Bipolar disorder Mother     Arthritis Mother      problems Mother     Mental illness Mother     Diabetes Father     Hypertension Father     Cancer Father     Prostate cancer Father     Heart disease Father     High cholesterol Father     Obesity Father     Arthritis Father     Coronary artery disease Father     Hyperlipidemia Father     Depression Sister     Multiple sclerosis Sister     Epilepsy Brother     Breast cancer Maternal Grandmother     Alcohol abuse Maternal Grandfather     Mental retardation Brother     Seizures Brother     Developmental delay Brother     Early death Brother     Multiple sclerosis Sister     Depression Sister          Social History   Social History     Socioeconomic History    Marital status: Single   Tobacco  Use    Smoking status: Never     Passive exposure: Never    Smokeless tobacco: Never    Tobacco comments:     2nd hand exposure to cigarettes and vaping.   Vaping Use    Vaping Use: Never used   Substance and Sexual Activity    Alcohol use: Not Currently     Comment: Stopped all alcohol when started on [place] meds.    Drug use: Never    Sexual activity: Yes     Partners: Male     Birth control/protection: Other     Comment: Pull-out method.        Medications:     Current Outpatient Medications:     baclofen (LIORESAL) 10 MG tablet, Take 1 tablet by mouth At Night As Needed., Disp: , Rfl:     EPINEPHrine 0.1 MG/0.1ML solution auto-injector, Inject  as directed As Needed., Disp: , Rfl:     estradiol (ESTRACE) 0.1 MG/GM vaginal cream, Insert 2 g into the vagina 2 (Two) Times a Day., Disp: , Rfl:     galcanezumab-gnlm (EMGALITY) 120 MG/ML auto-injector pen, Inject 1 mL under the skin into the appropriate area as directed Every 28 (Twenty-Eight) Days., Disp: 1 mL, Rfl: 11    levocetirizine (XYZAL) 5 MG tablet, Take 1 tablet by mouth Every Evening., Disp: , Rfl:     loratadine (CLARITIN) 10 MG tablet, Take 1 tablet by mouth Daily., Disp: , Rfl:     montelukast (SINGULAIR) 10 MG tablet, Take 1 tablet by mouth Every Night., Disp: , Rfl:     multivitamin with minerals tablet tablet, , Disp: , Rfl:     OnabotulinumtoxinA (Botox) 200 units reconstituted solution, Inject  into the appropriate muscle as directed by prescriber 1 (One) Time., Disp: , Rfl:     OXcarbazepine (TRILEPTAL) 300 MG tablet, Take 3 tablets by mouth 2 (Two) Times a Day., Disp: 540 tablet, Rfl: 3    pregabalin (LYRICA) 200 MG capsule, Take 1 capsule by mouth 3 (Three) Times a Day., Disp: 270 capsule, Rfl: 1    Ryaltris 665-25 MCG/ACT suspension, SPRAY 1 SPRAY TWICE A DAY BY INTRANASAL ROUTE., Disp: , Rfl:     traMADol (ULTRAM) 50 MG tablet, Take 1 tablet by mouth At Night As Needed., Disp: , Rfl:         Physical Exam:     Vitals:    06/19/23 1241   BP: 140/70  "  Pulse: 110   Resp: 16   Temp: 98.1 °F (36.7 °C)   SpO2: 99%   Weight: 117 kg (258 lb)   Height: 177.8 cm (70\")   PainSc:   6   PainLoc: Head        General: Alert and oriented, No acute distress.   HEENT: Normocephalic, atraumatic.   Cardiovascular: No gross edema  Respiratory: Respirations are non-labored    Photophobia  Bilateral V1, V2, V3 negative tenderness to palpation  No tenderness to light touch of left auriculotemporal nerve or posterior auricular  Negative occipital Tinel's  Cervical ROM full without pain  Psychiatric: Cooperative.   Gait: Normal   Assistive Devices:    Imaging Studies:   No results found for this or any previous visit.      Impression & Plan:   1/4/23:  Bev Ferrari is a 42 y.o. female with past medical history significant for fibromyalgia, anxiety, depression, who presents to the pain clinic for evaluation and treatment of headaches and facial pain.  Clinical examination most consistent with atypical facial pain and migraines/daily headaches.  Patient endorses daily painful headache despite multiple medications.  She indicates that her pain primarily originates from her left temporalis.  I have a low suspicion for neuralgia, higher suspicion for migraines versus tension headache.  Given the frequency of headaches and resistance to medication she is good candidate for Botox injections.  We discussed the relevant risk benefits and alternatives and patient would like to proceed.  Additionally I will refer her for CBT.  We did discuss that often pain relief is not achieved until after the second Botox injection.  3/30/23: No benefit after first Botox.  We will proceed with second and if no benefit will discontinue.  Fibromyalgia may be headache etiology as well.  6/19/2023: Minimal improvement with second Botox injection.  I will defer to Dr. Garcia about continuing as I think it is better to continue Botox injections in the neurology office.  Her pain presentation is most consistent " with fibromyalgia; encouraged continuing to work with pain psychology.  Unfortunately I do not have further recommendations to add beyond treatment of her underlying fibromyalgia.  We discussed the diagnosis of fibromyalgia as well as the evidence-based treatments including membrane stabilizers (gabapentin, pregabalin), SNRI (duloxetine, venlafaxine), physical activity (aqua therapy, beginners yoga) and cognitive behavioral therapy.    1. Fibromyalgia          PLAN:  1. Medication Recommendations: Continue per neurology    2. Physical Therapy: Continue HEP    3. Psychological: Continue with pain psychology    4. Complementary and alternative (CAM) Therapies:     5. Labs: None indicated     6. Imaging: None indicated     7. Interventions: None indicated    8. Referrals: None indicated     9. Records requested: n/a    10. Lifestyle goals:    Follow-up as needed    [facility]  Leo Owens MD

## 2023-07-18 ENCOUNTER — TELEPHONE (OUTPATIENT)
Dept: NEUROLOGY | Facility: CLINIC | Age: 43
End: 2023-07-18

## 2023-07-18 NOTE — TELEPHONE ENCOUNTER
Provider: DR PERAZA   Caller: MACY VANG  Relationship to Patient: PATIENT   Pharmacy: [facility] [id number]  Phone Number: [phone]   Reason for Call: PATIENT CALLED STATES THAAT SHE WAS GETTING BOTOX INJECTIONS FROM A DIFFERENT PROVIDER HOWEVER DR PERAZA HAD INFORMED HER THAT SHE COULD SWITCH TO HIM AND BE ENROLLED IN THE VOUCHER/COUPON PROGRAM THROUGH OUR OFFICE. PATIENT STATES THAT HER OTHER PROVIDER NO LONGER IS GOING TO DO BOTOX. PATIENT IS REQUESTING A CALL BACK TO DISCUSS OUR BOTOX PROGRAM FURTHER AND SEE WHAT SHE NEEDS TO DO TO GET SET UP WITH GETTING BOTOX THROUGH DR PERAZA.     PLEASE CALL PATIENT AND ADVISE     THANK YOU

## 2023-08-01 ENCOUNTER — SPECIALTY PHARMACY (OUTPATIENT)
Dept: ONCOLOGY | Facility: HOSPITAL | Age: 43
End: 2023-08-01
Payer: COMMERCIAL

## 2023-08-02 ENCOUNTER — PROCEDURE VISIT (OUTPATIENT)
Dept: NEUROLOGY | Facility: CLINIC | Age: 43
End: 2023-08-02
Payer: COMMERCIAL

## 2023-08-02 DIAGNOSIS — G43.709 CHRONIC MIGRAINE WITHOUT AURA WITHOUT STATUS MIGRAINOSUS, NOT INTRACTABLE: Primary | Chronic | ICD-10-CM

## 2023-08-03 ENCOUNTER — TELEPHONE (OUTPATIENT)
Dept: NEUROLOGY | Facility: CLINIC | Age: 43
End: 2023-08-03

## 2023-08-03 NOTE — TELEPHONE ENCOUNTER
PATIENT CALLING TODAY TO ADVISE - AFTER HER BOTOX 8/2/23, SHE BEGAN TO FEEL WEAKNESS, EYELID DROOPING, SHE FELT WEIRD - SHE SAYS IT LAST ED ABOUT 1 HOUR AFTER SHE GOT HOME.  TODAY, SHE FEELS FINE, SHE JUST WANTED PROVIDER TO BE AWARE.    PLEASE ADVISE PATIENT IF NECESSARY    THANK YOU

## 2023-08-15 ENCOUNTER — SPECIALTY PHARMACY (OUTPATIENT)
Dept: ONCOLOGY | Facility: HOSPITAL | Age: 43
End: 2023-08-15
Payer: COMMERCIAL

## 2023-08-15 NOTE — PROGRESS NOTES
Specialty Pharmacy Refill Coordination Note     Bev is a 43 y.o. female contacted today regarding refills of  Emgality specialty medication(s). Patient is due for injection on 8/17.      Reviewed and verified with patient:       Specialty medication(s) and dose(s) confirmed: yes    Refill Questions      Flowsheet Row Most Recent Value   Changes to allergies? No   Changes to medications? No   New conditions since last clinic visit No   Unplanned office visit, urgent care, ED, or hospital admission in the last 4 weeks  No   How does patient/caregiver feel medication is working? Very good   Financial problems or insurance changes  No   Since the previous refill, were any specialty medication doses or scheduled injections missed or delayed?  Yes   If yes, please provide the amount Patient will take injection 4 days late   Why were doses missed? Patient did not respond to phone calls or message in time for refill and injection due date   Does this patient require a clinical escalation to a pharmacist? No            Delivery Questions      Flowsheet Row Most Recent Value   Delivery method FedEx   Delivery address correct? Yes   Preferred delivery time? Anytime   Number of medications in delivery 1   Medication being filled and delivered Emgality   Doses left of specialty medications 0   Is there any medication that is due not being filled? No   Supplies needed? No supplies needed   Cooler needed? Yes   Do any medications need mixed or dated? No   Additional comments Emgality co-pay $0   Questions or concerns for the pharmacist? No   Explain any questions or concerns for the pharmacist N/A   Are any medications first time fills? No                   Follow-up: 28 day(s)     Zainab Johnston, Pharmacy Technician  Specialty Pharmacy Technician

## 2023-08-31 ENCOUNTER — TELEPHONE (OUTPATIENT)
Dept: NEUROLOGY | Facility: CLINIC | Age: 43
End: 2023-08-31

## 2023-08-31 NOTE — TELEPHONE ENCOUNTER
PATIENT WAS IN OFFICE 8/2/23 FOR BOTOX - SHE WAS TOLD AT THAT TIME SHE WOULD JUST HAVE A  CO-PAY.    SHE NOW HAS A BILL.    PLEASE ADVISE PATIENT    THANK YOU

## 2023-08-31 NOTE — TELEPHONE ENCOUNTER
Spoke to patient to get a better idea of what the bill she received is for.  She states is for the actual botox.  Informed patient that I am forwarding this msg to our speciality pharmacy and to expect a call from them.  She expressed appreciation.

## 2023-09-08 ENCOUNTER — SPECIALTY PHARMACY (OUTPATIENT)
Dept: ONCOLOGY | Facility: HOSPITAL | Age: 43
End: 2023-09-08
Payer: COMMERCIAL

## 2023-09-08 NOTE — PROGRESS NOTES
Specialty Pharmacy Refill Coordination Note     Bev is a 43 y.o. female contacted today regarding refills of Emgality specialty medication(s).. patient Injection is due on 9/14    Reviewed and verified with patient:       Specialty medication(s) and dose(s) confirmed: yes    Refill Questions      Flowsheet Row Most Recent Value   Changes to allergies? No   Changes to medications? No   New conditions since last clinic visit No   Unplanned office visit, urgent care, ED, or hospital admission in the last 4 weeks  No   How does patient/caregiver feel medication is working? Very good   Financial problems or insurance changes  No   Since the previous refill, were any specialty medication doses or scheduled injections missed or delayed?  No   If yes, please provide the amount N/A   Why were doses missed? N/A   Does this patient require a clinical escalation to a pharmacist? No            Delivery Questions      Flowsheet Row Most Recent Value   Delivery method FedEx   Delivery address correct? Yes   Delivery phone number mobile phone   Preferred delivery time? Anytime   Number of medications in delivery 1   Medication being filled and delivered Emgality = $0   Doses left of specialty medications N/A   Is there any medication that is due not being filled? No   Supplies needed? No supplies needed   Cooler needed? Yes   Do any medications need mixed or dated? No   Copay form of payment Payment plan already set up   Additional comments N/A   Questions or concerns for the pharmacist? No   Explain any questions or concerns for the pharmacist N/A   Are any medications first time fills? No   Tracking number for delivery N/A                   Follow-up: 30 day(s)     Boris Babin  Specialty Pharmacy Technician

## 2023-09-28 ENCOUNTER — SPECIALTY PHARMACY (OUTPATIENT)
Dept: ONCOLOGY | Facility: HOSPITAL | Age: 43
End: 2023-09-28
Payer: COMMERCIAL

## 2023-09-28 NOTE — PROGRESS NOTES
Specialty Pharmacy Refill Coordination Note     Bev is a 43 y.o. female contacted today regarding refills of  Emgality specialty medication(s). Patient is due for injection on 10/12.      Reviewed and verified with patient:       Specialty medication(s) and dose(s) confirmed: yes    Refill Questions      Flowsheet Row Most Recent Value   Changes to allergies? No   Changes to medications? No   New conditions since last clinic visit No   Unplanned office visit, urgent care, ED, or hospital admission in the last 4 weeks  No   How does patient/caregiver feel medication is working? Very good   Financial problems or insurance changes  No   Since the previous refill, were any specialty medication doses or scheduled injections missed or delayed?  No   If yes, please provide the amount N/A   Why were doses missed? N/A   Does this patient require a clinical escalation to a pharmacist? No            Delivery Questions      Flowsheet Row Most Recent Value   Delivery method FedEx   Delivery address correct? Yes   Preferred delivery time? Anytime   Number of medications in delivery 1   Medication being filled and delivered Emgality   Doses left of specialty medications Emgality=0   Is there any medication that is due not being filled? No   Supplies needed? No supplies needed   Cooler needed? Yes   Do any medications need mixed or dated? No   Copay form of payment Payment plan already set up   Additional comments N/A   Questions or concerns for the pharmacist? No   Are any medications first time fills? No                   Follow-up: 28 day(s)     Zainab Johnston, Pharmacy Technician  Specialty Pharmacy Technician

## 2023-10-20 ENCOUNTER — SPECIALTY PHARMACY (OUTPATIENT)
Dept: ONCOLOGY | Facility: HOSPITAL | Age: 43
End: 2023-10-20
Payer: COMMERCIAL

## 2023-10-20 NOTE — PROGRESS NOTES
Specialty Pharmacy Refill Coordination Note     Bev is a 43 y.o. female contacted today regarding refills of Emgality and Botox specialty medication(s).. patient Injection is due on 11/10    Reviewed and verified with patient:       Specialty medication(s) and dose(s) confirmed: yes    Refill Questions      Flowsheet Row Most Recent Value   Changes to allergies? No   Changes to medications? No   New conditions since last clinic visit No   Unplanned office visit, urgent care, ED, or hospital admission in the last 4 weeks  No   How does patient/caregiver feel medication is working? Very good   Financial problems or insurance changes  No   Since the previous refill, were any specialty medication doses or scheduled injections missed or delayed?  No   If yes, please provide the amount N/A   Why were doses missed? N/A   Does this patient require a clinical escalation to a pharmacist? No            Delivery Questions      Flowsheet Row Most Recent Value   Delivery method FedEx   Delivery address correct? Yes   Delivery phone number mobile phone   Preferred delivery time? Anytime   Number of medications in delivery 2   Medication(s) being filled and delivered Onabotulinumtoxina, Galcanezumab-Gnlm   Doses left of specialty medications N/A   Is there any medication that is due not being filled? No   Supplies needed? No supplies needed   Cooler needed? Yes   Do any medications need mixed or dated? No   Copay form of payment Payment plan already set up   Additional comments N/A   Questions or concerns for the pharmacist? No   Explain any questions or concerns for the pharmacist N/A   Are any medications first time fills? No   Tracking number for delivery N/A                   Follow-up: 30 day(s)     Boris Babin  Specialty Pharmacy Technician

## 2023-11-10 ENCOUNTER — PROCEDURE VISIT (OUTPATIENT)
Dept: NEUROLOGY | Facility: CLINIC | Age: 43
End: 2023-11-10
Payer: COMMERCIAL

## 2023-11-10 DIAGNOSIS — G43.709 CHRONIC MIGRAINE WITHOUT AURA WITHOUT STATUS MIGRAINOSUS, NOT INTRACTABLE: Primary | Chronic | ICD-10-CM

## 2023-11-10 RX ORDER — BACLOFEN 10 MG/1
10 TABLET ORAL NIGHTLY PRN
Qty: 30 TABLET | Refills: 2 | Status: SHIPPED | OUTPATIENT
Start: 2023-11-10 | End: 2024-11-09

## 2023-11-10 NOTE — PROGRESS NOTES
Botulinum Toxin Injection Procedure  Chief Complaint   Patient presents with    Botulinum Toxin Injection     Patient supplied - Do not bill      Pre-operative diagnosis: headache    Post-operative diagnosis: Same    Procedure: Chemical neurolysis    After risks and benefits were explained including bleeding, infection, worsening of pain, damage to the areas being injected, weakness of muscles, loss of muscle control, dysphagia if injecting the head or neck, facial droop if injecting the facial area, painful injection, allergic or other reaction to the medications being injected, and the failure of the procedure to help the problem, a signed consent was obtained.      The patient was placed in a comfortable area and the sites to be treated were identified. A time out was called and performed. The area to be treated was prepped three times with alcohol and the alcohol allowed to dry. Next, a 27 gauge needle was used to inject the medication in the area to be treated.    Area(s) injected: frontalis muscle, semispinalis capitis muscle and temporallis muscle    Medications used: botulinum toxin 200 mu, 2 mL of saline used to dilute the botulinum toxin.      The patient did tolerate the procedure well. There were no complications.       Physical Examination    Current Treatment (Units)   Splenius Capitis 25 units on the right and 25 units on the left.   Temporalis 25 units on the right and 25 units on the left.   Frontalis 27 units on the right and 28 units on the left.   EMG Guidance none .   Complications: none .   The total amount injected in units is 155 .   The total amount wasted in units is 45 .   The total amount submitted in units is 200 .

## 2023-11-15 ENCOUNTER — SPECIALTY PHARMACY (OUTPATIENT)
Dept: ONCOLOGY | Facility: HOSPITAL | Age: 43
End: 2023-11-15
Payer: COMMERCIAL

## 2023-11-15 NOTE — PROGRESS NOTES
Specialty Pharmacy Refill Coordination Note     Bev is a 43 y.o. female contacted today regarding refills of  Emgality specialty medication(s).. patient Injection is due on 12/9    Reviewed and verified with patient:       Specialty medication(s) and dose(s) confirmed: yes    Refill Questions      Flowsheet Row Most Recent Value   Changes to allergies? No   Changes to medications? Yes  [11/15 patient stating  has presciption Baclofen 10mg prn.]   New conditions since last clinic visit No   Unplanned office visit, urgent care, ED, or hospital admission in the last 4 weeks  Yes  [11/15 patient stating  has presciption Baclofen 10mg prn.]   How does patient/caregiver feel medication is working? Very good   Financial problems or insurance changes  No   Since the previous refill, were any specialty medication doses or scheduled injections missed or delayed?  No   If yes, please provide the amount N/A   Why were doses missed? N/A   Does this patient require a clinical escalation to a pharmacist? Yes  [11/15 patient stating  has presciption Baclofen 10mg prn.]            Delivery Questions      Flowsheet Row Most Recent Value   Delivery method FedEx   Delivery address correct? Yes   Delivery phone number mobile phone   Preferred delivery time? Anytime   Number of medications in delivery 1   Medication(s) being filled and delivered Galcanezumab-Gnlm   Doses left of specialty medications N/A   Is there any medication that is due not being filled? No   Supplies needed? No supplies needed   Cooler needed? Yes   Do any medications need mixed or dated? No   Copay form of payment Payment plan already set up   Additional comments N/A   Questions or concerns for the pharmacist? Yes   Explain any questions or concerns for the pharmacist 11/15 patient stating  has presciption Baclofen 10mg prn.   Are any medications first time fills? No   Tracking number for delivery N/A                   Follow-up: 30 day(s)      Boris Babin  Specialty Pharmacy Technician

## 2023-12-01 ENCOUNTER — OFFICE VISIT (OUTPATIENT)
Dept: NEUROLOGY | Facility: CLINIC | Age: 43
End: 2023-12-01
Payer: COMMERCIAL

## 2023-12-01 VITALS
DIASTOLIC BLOOD PRESSURE: 76 MMHG | WEIGHT: 259.6 LBS | BODY MASS INDEX: 37.16 KG/M2 | SYSTOLIC BLOOD PRESSURE: 118 MMHG | HEART RATE: 104 BPM | HEIGHT: 70 IN | OXYGEN SATURATION: 94 %

## 2023-12-01 DIAGNOSIS — G43.709 CHRONIC MIGRAINE WITHOUT AURA WITHOUT STATUS MIGRAINOSUS, NOT INTRACTABLE: Primary | Chronic | ICD-10-CM

## 2023-12-01 DIAGNOSIS — G50.0 TRIGEMINAL NEURALGIA: Chronic | ICD-10-CM

## 2023-12-01 PROCEDURE — 99214 OFFICE O/P EST MOD 30 MIN: CPT | Performed by: PSYCHIATRY & NEUROLOGY

## 2023-12-01 NOTE — PROGRESS NOTES
"Chief Complaint    Migraine and Trigeminal Neuralgia    Subjective        Bev Ferrari presents to [facility].    History of Present Illness    43 y.o. female returns in follow up.  Last visit on 11/10/23 performed BTX chronic migraines, continued OXC, PGB, Emgality.       Intensity 6 - 8/10.        - 900 mg TID      mg TID     Occurs daily.  Located over left side.       Decreased breakthrough pain .       Break through pain in afternoon.  Worsens with weather changes.       Dull ache on left side of head.  Does not like hair touching left side of face. Switches sides.  Mild to moderate intensity.       Problem history:     Pain started over left ear last March 2021.  Constant.  Intensity 6/10.       Progressed to sharp electrical shocks into face.  Touch and cold provoked shocks.   Tx with muscle relaxers and steroids w/o benefit.      Left face sensitive to touch.  Cold and weather changes increases discomfort.       Lyrica decreases pain by a moderate amount.       OXC reduced pain but had increased bruising.      Started on right side but is minor.       Reviewed medical records:     Sx of left facial pain and weakness.  Sx onset months ago.  Sx occur weekly.  Concurrent Sjogren's.     Recent onset of right sided facial pain.       Meds:  Tramadol, Lyrica, baclofen, TPM, OXC, CBZ, TCAD      Labs - 1/4/22 CBC, CMP - [facility]      HCT - normal     MRI Brain - [facility]        Objective   Vital Signs:  /76   Pulse 104   Ht 177.8 cm (70\")   Wt 118 kg (259 lb 9.6 oz)   SpO2 94%   BMI 37.25 kg/m²   Estimated body mass index is 37.25 kg/m² as calculated from the following:    Height as of this encounter: 177.8 cm (70\").    Weight as of this encounter: 118 kg (259 lb 9.6 oz).           Neurologic Exam     Mental Status   Oriented to person, place, and time.   Speech: speech is normal   Level of consciousness: alert  Knowledge: good and consistent with education.   Normal " comprehension.     Cranial Nerves   Cranial nerves II through XII intact.     CN II   Visual fields full to confrontation.   Visual acuity: normal  Right visual field deficit: none  Left visual field deficit: none     CN III, IV, VI   Pupils are equal, round, and reactive to light.  Extraocular motions are normal.   Nystagmus: none   Diplopia: none  Ophthalmoparesis: none  Upgaze: normal  Downgaze: normal  Conjugate gaze: present    CN V   Facial sensation intact.   Right corneal reflex: normal  Left corneal reflex: normal    CN VII   Right facial weakness: none  Left facial weakness: none    CN VIII   Hearing: intact    CN IX, X   Palate: symmetric  Right gag reflex: normal  Left gag reflex: normal    CN XI   Right sternocleidomastoid strength: normal  Left sternocleidomastoid strength: normal    CN XII   Tongue: not atrophic  Fasciculations: absent  Tongue deviation: none    Motor Exam   Muscle bulk: normal  Overall muscle tone: normal    Strength   Strength 5/5 throughout.     Sensory Exam   Light touch normal.     Gait, Coordination, and Reflexes     Gait  Gait: normal    Tremor   Resting tremor: absent  Intention tremor: absent  Action tremor: absent    Reflexes   Reflexes 2+ except as noted.      Physical Exam  Eyes:      Extraocular Movements: EOM normal.      Pupils: Pupils are equal, round, and reactive to light.   Neurological:      Mental Status: She is oriented to person, place, and time.      Cranial Nerves: Cranial nerves 2-12 are intact.      Motor: Motor strength is normal.     Gait: Gait is intact.   Psychiatric:         Speech: Speech normal.        Result Review :  The following data was reviewed by: Buck Garcia MD on 12/01/2023:                 Assessment and Plan   Diagnoses and all orders for this visit:    1. Chronic migraine without aura without status migrainosus, not intractable (Primary)  Assessment & Plan:  Headaches are improving with treatment.  Continue current treatment  regimen.    BTX decreasing intensity           2. Trigeminal neuralgia  Assessment & Plan:  Continue OXC and PGB                Follow Up   No follow-ups on file.  Patient was given instructions and counseling regarding her condition or for health maintenance advice. Please see specific information pulled into the AVS if appropriate.

## 2023-12-01 NOTE — ASSESSMENT & PLAN NOTE
Headaches are improving with treatment.  Continue current treatment regimen.    BTX decreasing intensity

## 2023-12-07 ENCOUNTER — SPECIALTY PHARMACY (OUTPATIENT)
Dept: ONCOLOGY | Facility: HOSPITAL | Age: 43
End: 2023-12-07
Payer: COMMERCIAL

## 2023-12-07 NOTE — PROGRESS NOTES
Specialty Pharmacy Refill Coordination Note     Bev is a 43 y.o. female contacted today regarding refills of Emgality specialty medication(s).. patient Injection is due 12/4    Reviewed and verified with patient:       Specialty medication(s) and dose(s) confirmed: yes    Refill Questions      Flowsheet Row Most Recent Value   Changes to allergies? No   Changes to medications? No   New conditions since last clinic visit No   Unplanned office visit, urgent care, ED, or hospital admission in the last 4 weeks  No   How does patient/caregiver feel medication is working? Very good   Financial problems or insurance changes  No   Since the previous refill, were any specialty medication doses or scheduled injections missed or delayed?  No   If yes, please provide the amount N/A   Why were doses missed? N/A   Does this patient require a clinical escalation to a pharmacist? No            Delivery Questions      Flowsheet Row Most Recent Value   Delivery method FedEx   Delivery address correct? Yes   Delivery phone number mobile phone   Preferred delivery time? Anytime   Number of medications in delivery 1   Medication(s) being filled and delivered Onabotulinumtoxina, Galcanezumab-Gnlm   Doses left of specialty medications N/A   Is there any medication that is due not being filled? No   Supplies needed? No supplies needed   Cooler needed? Yes   Do any medications need mixed or dated? No   Copay form of payment Payment plan already set up   Additional comments N/A   Questions or concerns for the pharmacist? No   Explain any questions or concerns for the pharmacist N/A   Are any medications first time fills? No   Tracking number for delivery N/A                   Follow-up: 30 day(s)     Boris Babin  Specialty Pharmacy Technician

## 2023-12-11 DIAGNOSIS — G50.0 TRIGEMINAL NEURALGIA: Chronic | ICD-10-CM

## 2023-12-12 ENCOUNTER — TELEPHONE (OUTPATIENT)
Dept: NEUROLOGY | Facility: CLINIC | Age: 43
End: 2023-12-12
Payer: COMMERCIAL

## 2023-12-12 RX ORDER — OXCARBAZEPINE 300 MG/1
900 TABLET, FILM COATED ORAL 2 TIMES DAILY
Qty: 540 TABLET | Refills: 3 | Status: SHIPPED | OUTPATIENT
Start: 2023-12-12

## 2023-12-12 RX ORDER — PREGABALIN 200 MG/1
200 CAPSULE ORAL 3 TIMES DAILY
Qty: 270 CAPSULE | Refills: 1 | Status: SHIPPED | OUTPATIENT
Start: 2023-12-12

## 2023-12-12 NOTE — TELEPHONE ENCOUNTER
Rx Refill Note  Requested Prescriptions     Pending Prescriptions Disp Refills    OXcarbazepine (TRILEPTAL) 300 MG tablet [Pharmacy Med Name: OXcarbazepine 300 MG TABLET] 540 tablet 3     Sig: TAKE THREE TABLETS BY MOUTH TWICE A DAY    pregabalin (LYRICA) 200 MG capsule [Pharmacy Med Name: PREGABALIN 200 MG CAPSULE] 270 capsule      Sig: TAKE ONE CAPSULE BY MOUTH THREE TIMES A DAY      Last filled: Oxcarbazepine 12/9/22 540 with 3 refills.  Pregablin 6/9/23 270 with 1 refill.  Last office visit with prescribing clinician: 12/1/2023      Next office visit with prescribing clinician: 2/16/2024     Sent in Oxcarbazepine 540 with 3 refills.      Leena Rey MA  12/12/23, 09:13 EST

## 2023-12-12 NOTE — TELEPHONE ENCOUNTER
Spoke to Aram at pharmacy.  She put a note in the patients account that we ok's the early fill.  LMVM to inform that the pharmacy states they can have her pregabalin  ready for her on 12/14/23.

## 2023-12-12 NOTE — TELEPHONE ENCOUNTER
Provider: STU    Caller: PATIENT    Relationship to Patient: SELF    Pharmacy: [facility]    Phone Number: [phone]    Reason for Call: PT IS GOING OUT OF TOWN ON 12/15/23 AND NEEDS TO  HER RX FOR PREGABALIN ON 12/14/23.  THE PHARMACY WILL NOT FILL THIS RX EARLY AND NEEDS TO HEAR FROM THE PROVIDER THAT IT IS OKAY TO FILL THIS EARLY.  PT USUALLY GETS THIS FILLED 3 MONTHS AT A TIME, BUT CAN GET JUST 1 MONTH IF NECESSARY TO BE ABLE TO  EARLY.    PLEASE REVIEW AND ADVISE     THANK YOU

## 2024-01-08 ENCOUNTER — SPECIALTY PHARMACY (OUTPATIENT)
Dept: PHARMACY | Facility: TELEHEALTH | Age: 44
End: 2024-01-08
Payer: COMMERCIAL

## 2024-01-08 NOTE — PROGRESS NOTES
Specialty Pharmacy Patient Management Program  Reassessment     Bev Ferrari is a 43 y.o. female with Chronic Migraines and enrolled in the Patient Management program offered by [facility]. A follow-up outreach was conducted, including assessment of continued therapy appropriateness, medication adherence, and side effect incidence and management for Emgality 120mg/mL.     Changes to Insurance Coverage or Financial Support  none    Relevant Past Medical History and Comorbidities  Relevant medical history and concomitant health conditions were discussed with the patient. The patient's chart has been reviewed for relevant past medical history and comorbid health conditions and updated as necessary.   Past Medical History:   Diagnosis Date    ADHD (attention deficit hyperactivity disorder)     Anxiety     Arthritis     Bowel trouble     Chronic mental illness     Chronic pain disorder     CTS (carpal tunnel syndrome)     Depression     Extremity pain     Fibromyalgia, primary     H/O seasonal allergies     Headache     Headache, tension-type     Hearing loss     HPV in female     Joint pain     Low back pain     Lumbosacral disc disease     Neck pain     Osteoarthritis     Periodic headache syndrome, not intractable 08/12/2022    Psychiatric illness     PTSD (post-traumatic stress disorder)     Sjogren's syndrome     Trigeminal neuralgia     Vision loss      Social History     Socioeconomic History    Marital status: Single   Tobacco Use    Smoking status: Never     Passive exposure: Never    Smokeless tobacco: Never    Tobacco comments:     2nd hand exposure to cigarettes and vaping.   Vaping Use    Vaping Use: Never used   Substance and Sexual Activity    Alcohol use: Not Currently     Comment: Stopped all alcohol when started on [place] meds.    Drug use: Never    Sexual activity: Yes     Partners: Male     Birth control/protection: Other     Comment: Pull-out method.     Problem list  reviewed by Zack Foster RPH on 1/8/2024 at 12:46 PM    Allergies  Known allergies and reactions were discussed with the patient. The patient's chart has been reviewed for allergy information and updated as necessary.   Allergies   Allergen Reactions    Azithromycin Itching    Molds & Smuts Other (See Comments)    Pollen Extract Other (See Comments)    Terbinafine Unknown - Low Severity    Hydrocodone-Acetaminophen Itching and Rash    Hydroxychloroquine Rash     Allergies reviewed by Zack Foster RPH on 1/8/2024 at 12:46 PM    Relevant Laboratory Values  Lab Results   Component Value Date    CALCIUM 9.0 06/08/2022     06/08/2022    K 3.8 06/08/2022    CO2 26 06/08/2022    CL 98 06/08/2022    BUN 19 06/08/2022    CREATININE 0.85 06/08/2022    BCR 22 06/08/2022    ANIONGAP 13 06/08/2022     Lab Results   Component Value Date    WBC 14.51 (H) 06/08/2022    HGB 14.5 06/08/2022    HCT 44.4 06/08/2022    MCV 87 06/08/2022     06/08/2022     Lab Value Review  The above lab values have been reviewed; the following specialty medication(s) dose adjustment(s) are recommended: none.    Current Medication List  This medication list has been reviewed with the patient and evaluated for any interactions or necessary modifications/recommendations, and updated to include all prescription medications, OTC medications, and supplements the patient is currently taking. This list reflects what is contained in the patient's profile, which has also been marked as reviewed to communicate to other providers it is the most up to date version of the patient's current medication therapy.     Current Outpatient Medications:     baclofen (LIORESAL) 10 MG tablet, Take 1 tablet by mouth At Night As Needed for Muscle Spasms., Disp: 30 tablet, Rfl: 2    EPINEPHrine 0.1 MG/0.1ML solution auto-injector, Inject  as directed As Needed., Disp: , Rfl:     estradiol (ESTRACE) 0.1 MG/GM vaginal cream, Insert 2 g into the vagina 2 (Two) Times  a Day. (Patient not taking: Reported on 12/1/2023), Disp: , Rfl:     galcanezumab-gnlm (EMGALITY) 120 MG/ML auto-injector pen, Inject 1 mL under the skin into the appropriate area as directed Every 28 (Twenty-Eight) Days., Disp: 1 mL, Rfl: 11    levocetirizine (XYZAL) 5 MG tablet, Take 1 tablet by mouth Every Evening., Disp: , Rfl:     loratadine (CLARITIN) 10 MG tablet, Take 1 tablet by mouth Daily., Disp: , Rfl:     montelukast (SINGULAIR) 10 MG tablet, Take 1 tablet by mouth Every Night., Disp: , Rfl:     multivitamin with minerals tablet tablet, , Disp: , Rfl:     OnabotulinumtoxinA 200 units reconstituted solution, . PHYSICIAN TO INJECT 155 UNITS INTRAMUSCULARLY INTO HEAD, NECK AND SHOULDERS EVERY 12 WEEKS Per FDA PROTOCOL, Disp: 1 each, Rfl: 3    OXcarbazepine (TRILEPTAL) 300 MG tablet, TAKE THREE TABLETS BY MOUTH TWICE A DAY, Disp: 540 tablet, Rfl: 3    pregabalin (LYRICA) 200 MG capsule, TAKE ONE CAPSULE BY MOUTH THREE TIMES A DAY, Disp: 270 capsule, Rfl: 1    Ryaltris 665-25 MCG/ACT suspension, SPRAY 1 SPRAY TWICE A DAY BY INTRANASAL ROUTE., Disp: , Rfl:     traMADol (ULTRAM) 50 MG tablet, Take 1 tablet by mouth At Night As Needed. (Patient not taking: Reported on 12/1/2023), Disp: , Rfl:   Medicines reviewed by Zack Foster RPH on 1/8/2024 at 12:46 PM    Drug Interactions  none     Adverse Drug Reactions  Medication tolerability: Tolerating with no to minimal ADRs  Medication plan: Continue therapy with normal follow-up  Plan for ADR Management: None    Hospitalizations and Urgent Care Since Last Assessment  Hospitalizations or Admissions: none  ED Visits: none  Urgent Office Visits: none     Adherence, Self-Administration, and Current Therapy Problems  Adherence related to the patient's specialty therapy was discussed with the patient. The Adherence segment of this outreach has been reviewed and updated.     Adherence Questions  Linked Medication(s) Assessed: Galcanezumab-Gnlm,  Onabotulinumtoxina  On average, how many doses/injections does the patient miss per month?: 0  What are the identified reasons for non-adherence or missed doses? : no problems identified  What is the estimated medication adherence level?: %  Based on the patient/caregiver response and refill history, does this patient require an MTP to track adherence improvements?: no    Additional Barriers to Patient Self-Administration: None  Methods for Supporting Patient Self-Administration: None    Open Medication Therapy Problems  No medication therapy recommendations to display    Goals of Therapy  Goals related to the patient's specialty therapy were discussed with the patient. The Patient Goals segment of this outreach has been reviewed and updated.   Goals Addressed Today        Specialty Pharmacy General Goal      Decrease frequency, severity and duration of migraine attacks by 25%                Progress Toward Meeting Patient-Identified Goals of Therapy: On Track  New Patient-Identified Goals, If Applicable:     Progress Toward Meeting Clinical Goals or Therapeutic Targets: On Track  New Clinical Goals or Therapeutic Targets, If Applicable:     Quality of Life Assessment   Quality of Life related to the patient's enrollment in the patient management program and services provided was discussed with the patient. The QOL segment of this outreach has been reviewed and updated.  Quality of Life Improvement Scale: 7-Somewhat better    Reassessment Plan & Follow-Up  Medication Therapy Changes: none  Additional Plans, Therapy Recommendations, or Therapy Problems to Be Addressed: none   Pharmacist to perform regular reassessments no more than (6) months from the previous assessment.  Care Coordinator to set up future refill outreaches, coordinate prescription delivery, and escalate clinical questions to pharmacist.     Attestation  I attest the patient was actively involved in and has agreed to the above plan of care. I  attest that the specialty medication(s) addressed above are appropriate for the patient based on my reassessment. If the prescribed therapy is at any point deemed not appropriate based on the current or future assessments, a consultation will be initiated with the patient's specialty care provider to determine the best course of action. The revised plan of therapy will be documented along with any required assessments and/or additional patient education provided.     Electronically signed by Zack Foster RPH, 01/08/24, 12:48 PM EST.

## 2024-01-08 NOTE — PROGRESS NOTES
Specialty Pharmacy Patient Management Program  Call Center Refill Outreach      Bev is a 43 y.o. female contacted today regarding refills of her medication(s).    Specialty medication(s) and dose(s) confirmed: Emgality 120mg/mL  Other medications being refilled: None    Refill Questions      Flowsheet Row Most Recent Value   Changes to allergies? No   Changes to medications? No   New conditions or infections since last clinic visit No   Unplanned office visit, urgent care, ED, or hospital admission in the last 4 weeks  No   How does patient/caregiver feel medication is working? Very good   Financial problems or insurance changes  No   Since the previous refill, were any specialty medication doses or scheduled injections missed or delayed?  No   If yes, please provide the amount None   Why were doses missed? NA   Does this patient require a clinical escalation to a pharmacist? No            Delivery Questions      Flowsheet Row Most Recent Value   Delivery method FedEx   Delivery address verified with patient/caregiver? Yes   Delivery address Home   Number of medications in delivery 1   Medication(s) being filled and delivered Galcanezumab-Gnlm   Doses left of specialty medications 0   Copay verified? Yes   Copay amount 0   Copay form of payment No copayment ($0)                   Follow-up: 3 weeks     Zack Foster [facility]  Specialty Pharmacist  1/8/2024  12:49 EST

## 2024-01-31 ENCOUNTER — SPECIALTY PHARMACY (OUTPATIENT)
Dept: ONCOLOGY | Facility: HOSPITAL | Age: 44
End: 2024-01-31
Payer: COMMERCIAL

## 2024-01-31 NOTE — PROGRESS NOTES
Specialty Pharmacy Refill Coordination Note     Bev is a 43 y.o. female contacted today regarding refills of Emgality specialty medication(s).. patient Injection is due on 02/02    Reviewed and verified with patient:       Specialty medication(s) and dose(s) confirmed: yes    Refill Questions      Flowsheet Row Most Recent Value   Changes to allergies? No   Changes to medications? No   New conditions or infections since last clinic visit No   Unplanned office visit, urgent care, ED, or hospital admission in the last 4 weeks  No   How does patient/caregiver feel medication is working? Very good   Financial problems or insurance changes  No   Since the previous refill, were any specialty medication doses or scheduled injections missed or delayed?  No   If yes, please provide the amount N/A   Why were doses missed? N/A   Does this patient require a clinical escalation to a pharmacist? No            Delivery Questions      Flowsheet Row Most Recent Value   Delivery method FedEx   Delivery address verified with patient/caregiver? Yes   Delivery address Home   Number of medications in delivery 1   Medication(s) being filled and delivered Galcanezumab-Gnlm   Doses left of specialty medications N/A   Copay verified? Yes   Copay amount N/A   Copay form of payment No copayment ($0)                   Follow-up: 28 day(s)     Boris Babin  Specialty Pharmacy Technician

## 2024-02-16 ENCOUNTER — PROCEDURE VISIT (OUTPATIENT)
Dept: NEUROLOGY | Facility: CLINIC | Age: 44
End: 2024-02-16
Payer: COMMERCIAL

## 2024-02-16 DIAGNOSIS — G43.709 CHRONIC MIGRAINE WITHOUT AURA WITHOUT STATUS MIGRAINOSUS, NOT INTRACTABLE: Primary | Chronic | ICD-10-CM

## 2024-02-16 DIAGNOSIS — G50.0 TRIGEMINAL NEURALGIA: Chronic | ICD-10-CM

## 2024-02-26 ENCOUNTER — SPECIALTY PHARMACY (OUTPATIENT)
Dept: ONCOLOGY | Facility: HOSPITAL | Age: 44
End: 2024-02-26
Payer: COMMERCIAL

## 2024-02-26 NOTE — PROGRESS NOTES
Specialty Pharmacy Refill Coordination Note     Bev is a 43 y.o. female contacted today regarding refills of Emgality specialty medication(s).. patient Injection is due on 02/28    Reviewed and verified with patient:       Specialty medication(s) and dose(s) confirmed: yes    Refill Questions      Flowsheet Row Most Recent Value   Changes to allergies? No   Changes to medications? No   New conditions or infections since last clinic visit No   Unplanned office visit, urgent care, ED, or hospital admission in the last 4 weeks  No   How does patient/caregiver feel medication is working? Very good   Financial problems or insurance changes  No   Since the previous refill, were any specialty medication doses or scheduled injections missed or delayed?  No   If yes, please provide the amount N/A   Why were doses missed? N/A   Does this patient require a clinical escalation to a pharmacist? No            Delivery Questions      Flowsheet Row Most Recent Value   Delivery method FedEx   Delivery address verified with patient/caregiver? Yes   Delivery address Home   Number of medications in delivery 1   Medication(s) being filled and delivered Galcanezumab-Gnlm   Doses left of specialty medications N/A   Copay verified? Yes   Copay amount N/A   Copay form of payment No copayment ($0)                   Follow-up: 28 day(s)     Boris Babin  Specialty Pharmacy Technician

## 2024-04-15 ENCOUNTER — TELEPHONE (OUTPATIENT)
Dept: NEUROLOGY | Facility: CLINIC | Age: 44
End: 2024-04-15
Payer: COMMERCIAL

## 2024-04-15 DIAGNOSIS — G50.0 TRIGEMINAL NEURALGIA: Chronic | ICD-10-CM

## 2024-04-15 NOTE — TELEPHONE ENCOUNTER
Caller: Bev Ferrari    Relationship: Self    Best call back number: [phone]    What is the best time to reach you: ANYTIME    Who are you requesting to speak with (clinical staff, provider,  specific staff member): DR. PERAZA    What was the call regarding: PATIENT STATED SHE PICKED UP HER MEDS OF THE OXCARBAZEPINE AND LYRICA LAST MONDAY BUT SHE HAS LOST HER MEDICATION. SHE BELIEVES SHE ACCIDENTALLY THREW THE MEDICATION AWAY. SHE NEEDS TO KNOW IF DR PERAZA WILL WRITE A HARD SCRIPT (PAPER SCRIPT) FOR HER FOR THE MEDICATION SO SHE CAN TAKE IT TO [facility] TO REFILL. SHE STATED SHE KNOWS HER INS MOST LIKELY WILL NOT COVER THIS AND WILL HAVE TO USE GOOD RX.     PLEASE REVIEW  THANK YOU     Is it okay if the provider responds through [employer]hart: YES

## 2024-04-16 ENCOUNTER — TELEPHONE (OUTPATIENT)
Dept: NEUROLOGY | Facility: CLINIC | Age: 44
End: 2024-04-16
Payer: COMMERCIAL

## 2024-04-16 ENCOUNTER — SPECIALTY PHARMACY (OUTPATIENT)
Dept: ONCOLOGY | Facility: HOSPITAL | Age: 44
End: 2024-04-16
Payer: COMMERCIAL

## 2024-04-16 RX ORDER — OXCARBAZEPINE 300 MG/1
900 TABLET, FILM COATED ORAL 2 TIMES DAILY
Qty: 540 TABLET | Refills: 0 | Status: SHIPPED | OUTPATIENT
Start: 2024-04-16

## 2024-04-16 RX ORDER — PREGABALIN 200 MG/1
200 CAPSULE ORAL 3 TIMES DAILY
Qty: 90 CAPSULE | Refills: 0 | Status: SHIPPED | OUTPATIENT
Start: 2024-04-16

## 2024-04-16 NOTE — TELEPHONE ENCOUNTER
Spoke to pharmacy to inform that new prescriptions would be sent out.  They expressed appreciation.

## 2024-04-16 NOTE — TELEPHONE ENCOUNTER
Caller: Bev Ferrari    Relationship: Self    Best call back number: [phone]    Preferred pharmacy: [facility] [id number] - [address] AT [facility] - [phone]  - [phone] FX     What was the call regarding: PT STATES [facility] ADVISED HER THAT THEY WOULD NEED REFILL OF LYRICA SENT IN AS SHE IS OUT OF REFILLS.    ADDITIONALLY, SINCE PT HAD LOST BOTH THE TRILEPTAL & LYRICA RXS, [facility] WILL REQUIRE VERBAL FROM OFFICE STAFF TO PROVIDE VERBAL OKAY TO FILL BOTH RXS EARLY.    Do you require a callback: YES, PLEASE CALL BACK OR SEND [employer] MESSAGE TO NOTIFY PT UPON COMPLETION.    PLEASE REVIEW AND ADVISE.

## 2024-04-16 NOTE — TELEPHONE ENCOUNTER
Spoke to patient.  She states that the pharmacy has told her that we just need to resend the prescriptions to her pharmacy.

## 2024-04-18 ENCOUNTER — TELEPHONE (OUTPATIENT)
Dept: NEUROLOGY | Facility: CLINIC | Age: 44
End: 2024-04-18
Payer: COMMERCIAL

## 2024-04-18 NOTE — TELEPHONE ENCOUNTER
PHARMACY CALLED.  SAID PT LOST HER MEDICATION. THEY WANT TO KNOW IF THEY CAN FILL HER MEDICATION EARLY. JOSE(PREGABALIN).

## 2024-04-19 NOTE — TELEPHONE ENCOUNTER
PATIENT CALLING TO SAY PHARMACY NOW NEEDS A VERBAL OKAY TO FILL THIS EARLY    PLEASE CALL PHARMACY AND LET THEM KNOW IT'S OKAY      [id number] - [place] - [id number] AT [facility] - [phone]  - [phone] FX

## 2024-04-19 NOTE — TELEPHONE ENCOUNTER
Per previous encounters with providers I.e annalise she was aware of lost scripts and ok'd these fills.    Called Machelle in [place] her Trileptal fill was already fine but need permission for early fill of Lyrica. Gave pharmacist permission and they are still working on but will get these ready. Thanks!

## 2024-05-07 ENCOUNTER — TELEPHONE (OUTPATIENT)
Dept: NEUROLOGY | Facility: CLINIC | Age: 44
End: 2024-05-07
Payer: COMMERCIAL

## 2024-05-08 ENCOUNTER — SPECIALTY PHARMACY (OUTPATIENT)
Dept: ONCOLOGY | Facility: HOSPITAL | Age: 44
End: 2024-05-08
Payer: COMMERCIAL

## 2024-05-15 ENCOUNTER — TELEPHONE (OUTPATIENT)
Dept: NEUROLOGY | Facility: CLINIC | Age: 44
End: 2024-05-15
Payer: COMMERCIAL

## 2024-05-15 DIAGNOSIS — G50.0 TRIGEMINAL NEURALGIA: Chronic | ICD-10-CM

## 2024-05-15 RX ORDER — PREGABALIN 200 MG/1
200 CAPSULE ORAL 3 TIMES DAILY
Qty: 90 CAPSULE | Refills: 1 | Status: SHIPPED | OUTPATIENT
Start: 2024-05-15

## 2024-05-15 NOTE — TELEPHONE ENCOUNTER
Rx Refill Note  Requested Prescriptions     Pending Prescriptions Disp Refills    pregabalin (LYRICA) 200 MG capsule [Pharmacy Med Name: PREGABALIN 200 MG CAPSULE] 90 capsule      Sig: TAKE 1 CAPSULE BY MOUTH 3 TIMES A DAY      Last filled: 4/16/24, 90 with 0 Refills.   Last office visit with prescribing clinician: 12/1/2023      Next office visit with prescribing clinician: 5/21/2024     Ama Thomason MA  05/15/24, 08:46 EDT

## 2024-05-21 ENCOUNTER — SPECIALTY PHARMACY (OUTPATIENT)
Dept: ONCOLOGY | Facility: HOSPITAL | Age: 44
End: 2024-05-21
Payer: COMMERCIAL

## 2024-05-21 ENCOUNTER — PROCEDURE VISIT (OUTPATIENT)
Dept: NEUROLOGY | Facility: CLINIC | Age: 44
End: 2024-05-21
Payer: COMMERCIAL

## 2024-05-21 DIAGNOSIS — G50.0 TRIGEMINAL NEURALGIA: Chronic | ICD-10-CM

## 2024-05-21 DIAGNOSIS — G43.709 CHRONIC MIGRAINE WITHOUT AURA WITHOUT STATUS MIGRAINOSUS, NOT INTRACTABLE: Primary | Chronic | ICD-10-CM

## 2024-05-21 PROCEDURE — 64615 CHEMODENERV MUSC MIGRAINE: CPT | Performed by: PSYCHIATRY & NEUROLOGY

## 2024-05-21 RX ORDER — PREGABALIN 200 MG/1
200 CAPSULE ORAL 3 TIMES DAILY
Qty: 90 CAPSULE | Refills: 1 | Status: SHIPPED | OUTPATIENT
Start: 2024-05-21

## 2024-05-21 NOTE — PROGRESS NOTES
Specialty Pharmacy Patient Management Program  Neurology Reassessment     Bev Ferrari is a 43 y.o. female with chronic migraines seen by a Neurology provider and enrolled in the Neurology Patient Management program offered by [facility].  A follow-up outreach was conducted, including assessment of continued therapy appropriateness, medication adherence, and side effect incidence and management for botox 200 units, emgality 120 mg sq q28 days.     Changes to Insurance Coverage or Financial Support    [facility] with copay card for emgality.    Relevant Past Medical History and Comorbidities  Relevant medical history and concomitant health conditions were discussed with the patient. The patient's chart has been reviewed for relevant past medical history and comorbid health conditions and updated as necessary.   Past Medical History:   Diagnosis Date    ADHD (attention deficit hyperactivity disorder)     Anxiety     Arthritis     Bowel trouble     Chronic mental illness     Chronic pain disorder     CTS (carpal tunnel syndrome)     Depression     Extremity pain     Fibromyalgia, primary     H/O seasonal allergies     Headache     Headache, tension-type     Hearing loss     HPV in female     Joint pain     Low back pain     Lumbosacral disc disease     Neck pain     Osteoarthritis     Periodic headache syndrome, not intractable 08/12/2022    Psychiatric illness     PTSD (post-traumatic stress disorder)     Sjogren's syndrome     Trigeminal neuralgia     Vision loss      Social History     Socioeconomic History    Marital status: Single   Tobacco Use    Smoking status: Never     Passive exposure: Never    Smokeless tobacco: Never    Tobacco comments:     2nd hand exposure to cigarettes and vaping.   Vaping Use    Vaping status: Never Used   Substance and Sexual Activity    Alcohol use: Not Currently     Comment: Stopped all alcohol when started on [place] meds.    Drug use: Never    Sexual  activity: Yes     Partners: Male     Birth control/protection: Other     Comment: Pull-out method.     Problem list reviewed by Miguel Lai, PharmD on 5/21/2024 at  2:36 PM    Hospitalizations and Urgent Care Since Last Assessment  ED Visits, Admissions, or Hospitalizations: none   Urgent Office Visits: none     Allergies  Known allergies and reactions were discussed with the patient. The patient's chart has been reviewed for allergy information and updated as necessary.   Allergies   Allergen Reactions    Azithromycin Itching    Molds & Smuts Other (See Comments)    Pollen Extract Other (See Comments)    Terbinafine Unknown - Low Severity    Hydrocodone-Acetaminophen Itching and Rash    Hydroxychloroquine Rash     Allergies reviewed by Miguel Lai, PharmD on 5/21/2024 at  2:36 PM    Relevant Laboratory Values      Lab Assessment      Current Medication List  This medication list has been reviewed with the patient and evaluated for any interactions or necessary modifications/recommendations, and updated to include all prescription medications, OTC medications, and supplements the patient is currently taking.  This list reflects what is contained in the patient's profile, which has also been marked as reviewed to communicate to other providers it is the most up to date version of the patient's current medication therapy.     Current Outpatient Medications:     baclofen (LIORESAL) 10 MG tablet, Take 1 tablet by mouth At Night As Needed for Muscle Spasms., Disp: 30 tablet, Rfl: 2    EPINEPHrine 0.1 MG/0.1ML solution auto-injector, Inject  as directed As Needed., Disp: , Rfl:     estradiol (ESTRACE) 0.1 MG/GM vaginal cream, Insert 2 g into the vagina 2 (Two) Times a Day. (Patient not taking: Reported on 12/1/2023), Disp: , Rfl:     galcanezumab-gnlm (EMGALITY) 120 MG/ML auto-injector pen, Inject 1 mL under the skin into the appropriate area as directed Every 28 (Twenty-Eight) Days., Disp: 1 mL, Rfl:  11    levocetirizine (XYZAL) 5 MG tablet, Take 1 tablet by mouth Every Evening., Disp: , Rfl:     loratadine (CLARITIN) 10 MG tablet, Take 1 tablet by mouth Daily., Disp: , Rfl:     montelukast (SINGULAIR) 10 MG tablet, Take 1 tablet by mouth Every Night., Disp: , Rfl:     multivitamin with minerals tablet tablet, , Disp: , Rfl:     OnabotulinumtoxinA 200 units reconstituted solution, . PHYSICIAN TO INJECT 155 UNITS INTRAMUSCULARLY INTO HEAD, NECK AND SHOULDERS EVERY 12 WEEKS Per FDA PROTOCOL, Disp: 1 each, Rfl: 3    OXcarbazepine (TRILEPTAL) 300 MG tablet, Take 3 tablets by mouth 2 (Two) Times a Day., Disp: 540 tablet, Rfl: 0    pregabalin (LYRICA) 200 MG capsule, Take 1 capsule by mouth 3 (Three) Times a Day., Disp: 90 capsule, Rfl: 1    Ryaltris 665-25 MCG/ACT suspension, SPRAY 1 SPRAY TWICE A DAY BY INTRANASAL ROUTE., Disp: , Rfl:     traMADol (ULTRAM) 50 MG tablet, Take 1 tablet by mouth At Night As Needed. (Patient not taking: Reported on 12/1/2023), Disp: , Rfl:   No current facility-administered medications for this visit.    Medicines reviewed by Miguel Lai, PharmD on 5/21/2024 at  2:36 PM    Drug Interactions  No relevant drug drug interactions with specialty medication.       Adverse Drug Reactions  Medication tolerability: Tolerating with no to minimal ADRs          Medication plan: Continue therapy with normal follow-up  Plan for ADR Management: patient will self report      Adherence, Self-Administration, and Current Therapy Problems  Adherence related patient's specialty therapy was discussed with the patient. The Adherence segment of this outreach has been reviewed and updated.   Adherence Questions  Linked Medication(s) Assessed: Galcanezumab-Gnlm, Onabotulinumtoxina  On average, how many doses/injections does the patient miss per month?: 0  What are the identified reasons for non-adherence or missed doses? : no problems identified  What is the estimated medication  adherence level?: 80-89% (botox 78%, and emgality 97%)  Based on the patient/caregiver response and refill history, does this patient require an MTP to track adherence improvements?: no    Additional Barriers to Patient Self-Administration: none  Methods for Supporting Patient Self-Administration: Patient is adept with emgality self-injections.      Recently Close Medication Therapy Problems  No medication therapy recommendations to display  Open Medication Therapy Problems  No medication therapy recommendations to display     Goals of Therapy  Goals related to the patient's specialty therapy was discussed with the patient. The Patient Goals segment of this outreach has been reviewed and updated.    Goals Addressed Today        Specialty Pharmacy General Goal      Decrease frequency, severity and duration of migraine attacks by 25%                 Quality of Life Assessment   Quality of Life related to the patient's enrollment in the patient management program and services provided was discussed with the patient. The QOL segment of this outreach has been reviewed and updated.   Quality of Life Improvement Scale: 6-A little better    Reassessment Plan & Follow-Up  Medication Therapy Changes: continue with emgality 120 mg SQ q28 days and botox 200 units [id number]  Related Plans, Therapy Recommendations, or Issues to Be Addressed:  n/a  Pharmacist to perform regular reassessments no more than (6) months from the previous assessment.  Care Coordinator to set up future refill outreaches, coordinate prescription delivery, and escalate clinical questions to pharmacist.     Attestation  Therapeutic appropriateness: Appropriate  I attest the patient was actively involved in and has agreed to the above plan of care. If the prescribed therapy is at any point deemed not appropriate based on the current or future assessments, a consultation will be initiated with the patient's specialty care provider to determine the best course  of action. The revised plan of therapy will be documented along with any additional patient education provided. Discussed aforementioned material with patient in person.    Miguel Lai, PharmD, [facility]  Clinic Specialty Pharmacist, Neurology  5/21/2024  14:37 EDT

## 2024-05-21 NOTE — PROGRESS NOTES
Botulinum Toxin Injection Procedure    Chief Complaint   Patient presents with    Botulinum Toxin Injection     Patient supplied - do not bill      History:  Response to treatment has reduced HA's at least 7 fewer days a month and/or 100 hours fewer each month.     Pre-operative diagnosis: headache    Post-operative diagnosis: Same    Procedure: Chemical neurolysis    After risks and benefits were explained including bleeding, infection, worsening of pain, damage to the areas being injected, weakness of muscles, loss of muscle control, dysphagia if injecting the head or neck, facial droop if injecting the facial area, painful injection, allergic or other reaction to the medications being injected, and the failure of the procedure to help the problem, a signed consent was obtained.      The patient was placed in a comfortable area and the sites to be treated were identified. A time out was called and performed. The area to be treated was prepped three times with alcohol and the alcohol allowed to dry. Next, a 27 gauge needle was used to inject the medication in the area to be treated.    Area(s) injected: frontalis muscle, semispinalis capitis muscle and temporallis muscle    Medications used: botulinum toxin 200 mu, 2 mL of saline used to dilute the botulinum toxin.      The patient did tolerate the procedure well. There were no complications.       Physical Examination    Current Treatment (Units)   Splenius Capitis 25 units on the right and 25 units on the left.   Temporalis 25 units on the right and 25 units on the left.   Frontalis 27 units on the right and 28 units on the left.   EMG Guidance none .   Complications: none .   The total amount injected in units is 155 .   The total amount wasted in units is 45 .   The total amount submitted in units is 200 .

## 2024-05-21 NOTE — TELEPHONE ENCOUNTER
Patient lost 90 day supply    Rx Refill Note  Requested Prescriptions     Pending Prescriptions Disp Refills    pregabalin (LYRICA) 200 MG capsule 90 capsule 1     Sig: Take 1 capsule by mouth 3 (Three) Times a Day.      Last filled:05/15/2024 w/1  Last office visit with prescribing clinician: 12/1/2023      Next office visit with prescribing clinician: 6/7/2024     Veronika Montes MA  05/21/24, 08:39 EDT

## 2024-05-31 ENCOUNTER — SPECIALTY PHARMACY (OUTPATIENT)
Dept: ONCOLOGY | Facility: HOSPITAL | Age: 44
End: 2024-05-31
Payer: COMMERCIAL

## 2024-05-31 NOTE — PROGRESS NOTES
Specialty Pharmacy Refill Coordination Note     Bev is a 43 y.o. female contacted today regarding refills of Emgality  specialty medication(s)..Patient is due for injection on 06/23.    Reviewed and verified with patient:       Specialty medication(s) and dose(s) confirmed: yes    Refill Questions      Flowsheet Row Most Recent Value   Changes to allergies? No   Changes to medications? No   New conditions or infections since last clinic visit No   Unplanned office visit, urgent care, ED, or hospital admission in the last 4 weeks  No   How does patient/caregiver feel medication is working? Good   Financial problems or insurance changes  No   Since the previous refill, were any specialty medication doses or scheduled injections missed or delayed?  No   If yes, please provide the amount N/A   Why were doses missed? N/A   Does this patient require a clinical escalation to a pharmacist? No            Delivery Questions      Flowsheet Row Most Recent Value   Delivery method [employer]Ex   Delivery address verified with patient/caregiver? Yes   Delivery address Home   Number of medications in delivery 1   Medication(s) being filled and delivered Galcanezumab-Gnlm   Doses left of specialty medications N/A   Copay verified? Yes   Copay amount N/A   Copay form of payment No copayment ($0)                   Follow-up: 28 day(s)     Boris Babin  Specialty Pharmacy Technician

## 2024-06-07 ENCOUNTER — OFFICE VISIT (OUTPATIENT)
Dept: NEUROLOGY | Facility: CLINIC | Age: 44
End: 2024-06-07
Payer: COMMERCIAL

## 2024-06-07 VITALS
HEIGHT: 70 IN | DIASTOLIC BLOOD PRESSURE: 76 MMHG | SYSTOLIC BLOOD PRESSURE: 122 MMHG | HEART RATE: 71 BPM | BODY MASS INDEX: 36.51 KG/M2 | WEIGHT: 255 LBS | OXYGEN SATURATION: 99 %

## 2024-06-07 DIAGNOSIS — M35.07 SJOGREN'S SYNDROME WITH CENTRAL NERVOUS SYSTEM INVOLVEMENT: Chronic | ICD-10-CM

## 2024-06-07 DIAGNOSIS — G43.709 CHRONIC MIGRAINE WITHOUT AURA WITHOUT STATUS MIGRAINOSUS, NOT INTRACTABLE: Chronic | ICD-10-CM

## 2024-06-07 DIAGNOSIS — G50.0 TRIGEMINAL NEURALGIA: Primary | Chronic | ICD-10-CM

## 2024-06-07 PROCEDURE — 99214 OFFICE O/P EST MOD 30 MIN: CPT | Performed by: PSYCHIATRY & NEUROLOGY

## 2024-06-07 RX ORDER — BACLOFEN 10 MG/1
10 TABLET ORAL NIGHTLY PRN
Qty: 30 TABLET | Refills: 2 | Status: SHIPPED | OUTPATIENT
Start: 2024-06-07 | End: 2024-06-07 | Stop reason: SDUPTHER

## 2024-06-07 RX ORDER — OXCARBAZEPINE 300 MG/1
900 TABLET, FILM COATED ORAL 2 TIMES DAILY
Qty: 540 TABLET | Refills: 3 | Status: SHIPPED | OUTPATIENT
Start: 2024-06-07

## 2024-06-07 RX ORDER — BACLOFEN 10 MG/1
10 TABLET ORAL 3 TIMES DAILY
Qty: 90 TABLET | Refills: 11 | Status: SHIPPED | OUTPATIENT
Start: 2024-06-07 | End: 2025-06-07

## 2024-06-07 RX ORDER — PREGABALIN 200 MG/1
200 CAPSULE ORAL 3 TIMES DAILY
Qty: 90 CAPSULE | Refills: 1 | Status: SHIPPED | OUTPATIENT
Start: 2024-06-07

## 2024-06-07 NOTE — PROGRESS NOTES
"Chief Complaint    Migraine and Trigeminal Neuralgia    Subjective        Bev Ferrari presents to [facility] NEUROLOGY  History of Present Illness    43 y.o. female returns in follow up.  Last visit on 5/21/2 performed BTX,  continued OXC, PGB, Emgality.       Sharp pain in left occiput.      Requesting increase in baclofen.  Muscle tightness in neck worsening discomfort.      Intensity 6 - 8/10.        - 900 mg TID      mg TID     Occurs daily.  Located over left side.        Break through pain in afternoon.  Worsens with weather changes.       Dull ache on left side of head.  Does not like hair touching left side of face. Switches sides.  Mild to moderate intensity.       Problem history:     Pain started over left ear last March 2021.  Constant.  Intensity 6/10.       Progressed to sharp electrical shocks into face.  Touch and cold provoked shocks.   Tx with muscle relaxers and steroids w/o benefit.      Left face sensitive to touch.  Cold and weather changes increases discomfort.       Lyrica decreases pain by a moderate amount.       OXC reduced pain but had increased bruising.      Started on right side but is minor.       Reviewed medical records:     Sx of left facial pain and weakness.  Sx onset months ago.  Sx occur weekly.  Concurrent Sjogren's.     Recent onset of right sided facial pain.       Meds:  Tramadol, Lyrica, baclofen, TPM, OXC, CBZ, TCAD      Labs - 1/4/22 CBC, CMP - [facility]      HCT - normal     MRI Brain - [facility]     Objective   Vital Signs:  /76   Pulse 71   Ht 177.8 cm (70\")   Wt 116 kg (255 lb)   SpO2 99%   BMI 36.59 kg/m²   Estimated body mass index is 36.59 kg/m² as calculated from the following:    Height as of this encounter: 177.8 cm (70\").    Weight as of this encounter: 116 kg (255 lb).           Neurologic Exam     Mental Status   Oriented to person, place, and time.   Speech: speech is normal   Level of consciousness: alert  Knowledge: " good and consistent with education.   Normal comprehension.     Cranial Nerves   Cranial nerves II through XII intact.     CN II   Visual fields full to confrontation.   Visual acuity: normal  Right visual field deficit: none  Left visual field deficit: none     CN III, IV, VI   Pupils are equal, round, and reactive to light.  Extraocular motions are normal.   Nystagmus: none   Diplopia: none  Ophthalmoparesis: none  Upgaze: normal  Downgaze: normal  Conjugate gaze: present    CN V   Facial sensation intact.   Right corneal reflex: normal  Left corneal reflex: normal    CN VII   Right facial weakness: none  Left facial weakness: none    CN VIII   Hearing: intact    CN IX, X   Palate: symmetric  Right gag reflex: normal  Left gag reflex: normal    CN XI   Right sternocleidomastoid strength: normal  Left sternocleidomastoid strength: normal    CN XII   Tongue: not atrophic  Fasciculations: absent  Tongue deviation: none    Motor Exam   Muscle bulk: normal  Overall muscle tone: normal    Strength   Strength 5/5 throughout.     Sensory Exam   Light touch normal.     Gait, Coordination, and Reflexes     Gait  Gait: normal    Tremor   Resting tremor: absent  Intention tremor: absent  Action tremor: absent    Reflexes   Reflexes 2+ except as noted.        Physical Exam  Eyes:      Extraocular Movements: EOM normal.      Pupils: Pupils are equal, round, and reactive to light.   Neurological:      Mental Status: She is oriented to person, place, and time.      Cranial Nerves: Cranial nerves 2-12 are intact.      Motor: Motor strength is normal.     Gait: Gait is intact.   Psychiatric:         Speech: Speech normal.        Result Review :    The following data was reviewed by: Buck Garcia MD on 06/07/2024:                 Assessment and Plan     Diagnoses and all orders for this visit:    1. Trigeminal neuralgia (Primary)  Assessment & Plan:  Continue OXC, PGB, Baclofen     Orders:  -     pregabalin (LYRICA) 200 MG  capsule; Take 1 capsule by mouth 3 (Three) Times a Day.  Dispense: 90 capsule; Refill: 1  -     OXcarbazepine (TRILEPTAL) 300 MG tablet; Take 3 tablets by mouth 2 (Two) Times a Day.  Dispense: 540 tablet; Refill: 3    2. Chronic migraine without aura without status migrainosus, not intractable  Assessment & Plan:  Continue BTX and Emgality              3. Sjogren's syndrome with central nervous system involvement    Other orders  -     Discontinue: baclofen (LIORESAL) 10 MG tablet; Take 1 tablet by mouth At Night As Needed for Muscle Spasms.  Dispense: 30 tablet; Refill: 2  -     baclofen (LIORESAL) 10 MG tablet; Take 1 tablet by mouth 3 (Three) Times a Day.  Dispense: 90 tablet; Refill: 11             Follow Up     No follow-ups on file.  Patient was given instructions and counseling regarding her condition or for health maintenance advice. Please see specific information pulled into the AVS if appropriate.

## 2024-07-03 ENCOUNTER — SPECIALTY PHARMACY (OUTPATIENT)
Dept: ONCOLOGY | Facility: HOSPITAL | Age: 44
End: 2024-07-03
Payer: COMMERCIAL

## 2024-07-03 NOTE — PROGRESS NOTES
Specialty Pharmacy Refill Coordination Note     Bev is a 43 y.o. female contacted today regarding refills of Emgality and Botox  specialty medication(s).Patient is due for injection on 07/18.    Reviewed and verified with patient:       Specialty medication(s) and dose(s) confirmed: yes    Refill Questions      Flowsheet Row Most Recent Value   Changes to allergies? No   Changes to medications? No   New conditions or infections since last clinic visit No   Unplanned office visit, urgent care, ED, or hospital admission in the last 4 weeks  No   How does patient/caregiver feel medication is working? Good   Financial problems or insurance changes  No   Since the previous refill, were any specialty medication doses or scheduled injections missed or delayed?  No   If yes, please provide the amount N/A   Why were doses missed? N/A   Does this patient require a clinical escalation to a pharmacist? No            Delivery Questions      Flowsheet Row Most Recent Value   Delivery method [employer]Ex   Delivery address verified with patient/caregiver? Yes   Delivery address Home   Number of medications in delivery 2   Medication(s) being filled and delivered Galcanezumab-Gnlm, Onabotulinumtoxina   Doses left of specialty medications N/A   Copay verified? Yes   Copay amount N/A   Copay form of payment No copayment ($0)   Ship Date 07/10   Delivery Date 07/11   Signature Required Yes                   Follow-up: 28 day(s)     Boris Babin  Specialty Pharmacy Technician

## 2024-08-07 DIAGNOSIS — G50.0 TRIGEMINAL NEURALGIA: Chronic | ICD-10-CM

## 2024-08-07 RX ORDER — PREGABALIN 200 MG/1
200 CAPSULE ORAL 3 TIMES DAILY
Qty: 270 CAPSULE | Refills: 1 | Status: SHIPPED | OUTPATIENT
Start: 2024-08-07

## 2024-08-07 NOTE — TELEPHONE ENCOUNTER
Patient requesting 90 day supply  Rx Refill Note  Requested Prescriptions     Pending Prescriptions Disp Refills    pregabalin (LYRICA) 200 MG capsule 90 capsule 1     Sig: Take 1 capsule by mouth 3 (Three) Times a Day.      Last filled:  06/07/2024 w/1  Last office visit with prescribing clinician: 6/7/2024      Next office visit with prescribing clinician: 8/16/2024     Veronika Montes MA  08/07/24, 10:15 EDT

## 2024-08-07 NOTE — TELEPHONE ENCOUNTER
Caller: Bev Ferrari    Relationship: Self    Best call back number:   Telephone Information:   Mobile [phone]         Requested Prescriptions:   Requested Prescriptions     Pending Prescriptions Disp Refills    pregabalin (LYRICA) 200 MG capsule 90 capsule 1     Sig: Take 1 capsule by mouth 3 (Three) Times a Day.        Pharmacy where request should be sent: [facility] [id number] AT [facility] [phone] [facility] [phone] FX     Last office visit with prescribing clinician: 6/7/2024   Last telemedicine visit with prescribing clinician: Visit date not found   Next office visit with prescribing clinician: 8/16/2024     Additional details provided by patient: PT CURRENTLY HAS ONE WEEKS WORTH OF MEDICATION LEFT. . SHE IS REQUESTING A 90 DAY SUPPLY.     Does the patient have less than a 3 day supply:  [] Yes  [x] No    Would you like a call back once the refill request has been completed: [] Yes [x] No    If the office needs to give you a call back, can they leave a voicemail: [] Yes [x] No    Jose Daniel Bergeron Rep   08/07/24 10:00 EDT

## 2024-08-08 ENCOUNTER — SPECIALTY PHARMACY (OUTPATIENT)
Dept: ONCOLOGY | Facility: HOSPITAL | Age: 44
End: 2024-08-08
Payer: COMMERCIAL

## 2024-08-08 NOTE — PROGRESS NOTES
Specialty Pharmacy Refill Coordination Note     Bev is a 44 y.o. female contacted today regarding refills of Emgality specialty medication(s).Patient is due for injection on 08/15.    Reviewed and verified with patient:       Specialty medication(s) and dose(s) confirmed: yes    Refill Questions      Flowsheet Row Most Recent Value   Changes to allergies? No   Changes to medications? No   New conditions or infections since last clinic visit No   Unplanned office visit, urgent care, ED, or hospital admission in the last 4 weeks  No   How does patient/caregiver feel medication is working? Good   Financial problems or insurance changes  No   Since the previous refill, were any specialty medication doses or scheduled injections missed or delayed?  No   If yes, please provide the amount N/A   Why were doses missed? N/A   Does this patient require a clinical escalation to a pharmacist? No            Delivery Questions      Flowsheet Row Most Recent Value   Delivery method FedEx   Delivery address verified with patient/caregiver? Yes   Delivery address Home   Number of medications in delivery 1   Medication(s) being filled and delivered Galcanezumab-Gnlm   Doses left of specialty medications N/A   Copay verified? Yes   Copay amount Emgality =$0   Copay form of payment No copayment ($0)   Ship Date 08/12   Delivery Date 08/13   Signature Required No                   Follow-up: 28 day(s)     Boris Babin  Specialty Pharmacy Technician

## 2024-08-16 ENCOUNTER — PROCEDURE VISIT (OUTPATIENT)
Dept: NEUROLOGY | Facility: CLINIC | Age: 44
End: 2024-08-16
Payer: COMMERCIAL

## 2024-08-16 DIAGNOSIS — G43.709 CHRONIC MIGRAINE WITHOUT AURA WITHOUT STATUS MIGRAINOSUS, NOT INTRACTABLE: Primary | Chronic | ICD-10-CM

## 2024-09-04 ENCOUNTER — SPECIALTY PHARMACY (OUTPATIENT)
Dept: ONCOLOGY | Facility: HOSPITAL | Age: 44
End: 2024-09-04
Payer: COMMERCIAL

## 2024-09-04 NOTE — PROGRESS NOTES
Specialty Pharmacy Refill Coordination Note     Bev is a 44 y.o. female contacted today regarding refills of Emgality specialty medication(s).Patient is due for injection on 09/10.    Reviewed and verified with patient:       Specialty medication(s) and dose(s) confirmed: yes    Refill Questions      Flowsheet Row Most Recent Value   Changes to allergies? No   Changes to medications? Yes  [09/04 patient stating the Dr. Elavil 25mg at bedtime and copound topical cream consist of Ketamine,Gabapentin,Ibuprofen, Lidocaine and Baclofen.2 times daily.]   New conditions or infections since last clinic visit No   Unplanned office visit, urgent care, ED, or hospital admission in the last 4 weeks  Yes  [09/04 patient stating the Dr. Elavil 25mg at bedtime and copound topical cream consist of Ketamin,Gabapentin,Ibuprofen, Lidocaine and Baclofen.2 times daily.]   How does patient/caregiver feel medication is working? Good   Financial problems or insurance changes  No   Since the previous refill, were any specialty medication doses or scheduled injections missed or delayed?  No   If yes, please provide the amount N/A   Why were doses missed? N/A   Does this patient require a clinical escalation to a pharmacist? Yes  [09/04 patient stating the Dr. Elavil 25mg at bedtime and copound topical cream consist of Ketamine,Gabapentin,Ibuprofen, Lidocaine and Baclofen.2 times daily.]            Delivery Questions      Flowsheet Row Most Recent Value   Delivery method UPS   Delivery address verified with patient/caregiver? Yes   Delivery address Home   Number of medications in delivery 1   Medication(s) being filled and delivered Galcanezumab-Gnlm   Doses left of specialty medications N/A   Copay verified? Yes   Copay amount Emgality =$0   Copay form of payment No copayment ($0)   Ship Date 09/05   Delivery Date 09/06   Signature Required No                   Follow-up: 28 day(s)     Boris Babin  Specialty Pharmacy Technician

## 2024-09-25 ENCOUNTER — SPECIALTY PHARMACY (OUTPATIENT)
Dept: ONCOLOGY | Facility: HOSPITAL | Age: 44
End: 2024-09-25
Payer: COMMERCIAL

## 2024-10-02 ENCOUNTER — TRANSCRIBE ORDERS (OUTPATIENT)
Dept: ADMINISTRATIVE | Facility: HOSPITAL | Age: 44
End: 2024-10-02
Payer: COMMERCIAL

## 2024-10-02 DIAGNOSIS — Z12.31 SCREENING MAMMOGRAM FOR BREAST CANCER: Primary | ICD-10-CM

## 2024-10-18 ENCOUNTER — SPECIALTY PHARMACY (OUTPATIENT)
Dept: ONCOLOGY | Facility: HOSPITAL | Age: 44
End: 2024-10-18
Payer: COMMERCIAL

## 2024-10-18 NOTE — PROGRESS NOTES
Specialty Pharmacy Refill Coordination Note     Bev is a 44 y.o. female contacted today regarding refills of Emgality and Botox specialty medication(s).Patient is due for injection on 11/05.    Reviewed and verified with patient:       Specialty medication(s) and dose(s) confirmed: yes    Refill Questions      Flowsheet Row Most Recent Value   Changes to allergies? No   Changes to medications? No   New conditions or infections since last clinic visit No   Unplanned office visit, urgent care, ED, or hospital admission in the last 4 weeks  No   How does patient/caregiver feel medication is working? Good   Financial problems or insurance changes  No   Since the previous refill, were any specialty medication doses or scheduled injections missed or delayed?  No   If yes, please provide the amount N/A   Why were doses missed? N/A   Does this patient require a clinical escalation to a pharmacist? No            Delivery Questions      Flowsheet Row Most Recent Value   Delivery method UPS   Delivery address verified with patient/caregiver? Yes   Delivery address Home   Number of medications in delivery 1   Medication(s) being filled and delivered Galcanezumab-gnlm (EMGALITY), OnabotulinumtoxinA   Doses left of specialty medications N/A   Copay verified? Yes   Copay amount Emgality and Botox =$0   Copay form of payment No copayment ($0)   Ship Date 10/21   Delivery Date 10/22   Signature Required No                   Follow-up: 28 day(s)     Boris Babin  Specialty Pharmacy Technician

## 2024-10-24 LAB
NCCN CRITERIA FLAG: ABNORMAL
TYRER CUZICK SCORE: 16.8

## 2024-10-28 NOTE — PROGRESS NOTES
This patient recently took the CARE risk assessment for a mammogram appointment. Based on the patient's responses, NCCN criteria for genetic testing was met.     Navigator follow-up:   The patient is going to consider genetic testing at this time and contact me if she would like to proceed or needs assistance.

## 2024-11-08 ENCOUNTER — HOSPITAL ENCOUNTER (OUTPATIENT)
Dept: MAMMOGRAPHY | Facility: HOSPITAL | Age: 44
Discharge: HOME | End: 2024-11-08
Admitting: STUDENT IN AN ORGANIZED HEALTH CARE EDUCATION/TRAINING PROGRAM
Payer: COMMERCIAL

## 2024-11-08 DIAGNOSIS — Z12.31 SCREENING MAMMOGRAM FOR BREAST CANCER: ICD-10-CM

## 2024-11-08 PROCEDURE — 77063 BREAST TOMOSYNTHESIS BI: CPT

## 2024-11-08 PROCEDURE — 77067 SCR MAMMO BI INCL CAD: CPT

## 2024-11-15 ENCOUNTER — PROCEDURE VISIT (OUTPATIENT)
Dept: NEUROLOGY | Facility: CLINIC | Age: 44
End: 2024-11-15
Payer: COMMERCIAL

## 2024-11-15 ENCOUNTER — SPECIALTY PHARMACY (OUTPATIENT)
Dept: ONCOLOGY | Facility: HOSPITAL | Age: 44
End: 2024-11-15
Payer: COMMERCIAL

## 2024-11-15 DIAGNOSIS — M35.07 SJOGREN'S SYNDROME WITH CENTRAL NERVOUS SYSTEM INVOLVEMENT: Chronic | ICD-10-CM

## 2024-11-15 DIAGNOSIS — G43.709 CHRONIC MIGRAINE WITHOUT AURA WITHOUT STATUS MIGRAINOSUS, NOT INTRACTABLE: Primary | Chronic | ICD-10-CM

## 2024-11-15 DIAGNOSIS — G50.0 TRIGEMINAL NEURALGIA: Chronic | ICD-10-CM

## 2024-11-15 RX ORDER — SODIUM PICOSULFATE, MAGNESIUM OXIDE, AND ANHYDROUS CITRIC ACID 12; 3.5; 1 G/175ML; G/175ML; MG/175ML
LIQUID ORAL
COMMUNITY
Start: 2024-10-22

## 2024-11-15 RX ORDER — OXYMETAZOLINE HCL 100 %
POWDER (GRAM) MISCELLANEOUS
COMMUNITY
Start: 2024-10-25

## 2024-11-15 NOTE — PROGRESS NOTES
Specialty Pharmacy Patient Management Program  Neurology Reassessment     Bev Ferrari is a 44 y.o. female with migraines seen by a Neurology provider and enrolled in the Neurology Patient Management program offered by [facility].  A follow-up outreach was conducted, including assessment of continued therapy appropriateness, medication adherence, and side effect incidence and management for emgality 120 mg/mL and botox in office.     Changes to Insurance Coverage or Financial Support  No chagnes     Relevant Past Medical History and Comorbidities  Relevant medical history and concomitant health conditions were discussed with the patient. The patient's chart has been reviewed for relevant past medical history and comorbid health conditions and updated as necessary.   Past Medical History:   Diagnosis Date    ADHD (attention deficit hyperactivity disorder)     Anxiety     Arthritis     Bowel trouble     Chronic mental illness     Chronic pain disorder     Cluster headache     CTS (carpal tunnel syndrome)     Depression     Difficulty walking     Extremity pain     Fibromyalgia, primary     H/O seasonal allergies     Headache     Headache, tension-type     Hearing loss     HPV in female     Joint pain     Low back pain     Lumbosacral disc disease     Neck pain     Osteoarthritis     Periodic headache syndrome, not intractable 08/12/2022    Psychiatric illness     PTSD (post-traumatic stress disorder)     Sjogren's syndrome     Trigeminal neuralgia     Vision loss      Social History     Socioeconomic History    Marital status:    Tobacco Use    Smoking status: Never     Passive exposure: Never    Smokeless tobacco: Never    Tobacco comments:     2nd hand exposure to cigarettes and vaping.   Vaping Use    Vaping status: Never Used   Substance and Sexual Activity    Alcohol use: Not Currently     Comment: Stopped all alcohol when started on [place] meds.    Drug use: Never    Sexual  activity: Yes     Partners: Male     Birth control/protection: Condom, Other     Comment: Pull-out method.     Problem list reviewed by Miguel Lai, PharmD on 11/15/2024 at  3:23 PM    Hospitalizations and Urgent Care Since Last Assessment  ED Visits, Admissions, or Hospitalizations: none   Urgent Office Visits: n/a     Allergies  Known allergies and reactions were discussed with the patient. The patient's chart has been reviewed for allergy information and updated as necessary.   Allergies   Allergen Reactions    Azithromycin Itching    Molds & Smuts Other (See Comments)    Pollen Extract Other (See Comments)    Terbinafine Unknown - Low Severity    Hydrocodone-Acetaminophen Itching and Rash    Hydroxychloroquine Rash     Allergies reviewed by Miguel Lai, PharmD on 11/15/2024 at  3:23 PM    Relevant Laboratory Values      Lab Assessment      Current Medication List  This medication list has been reviewed with the patient and evaluated for any interactions or necessary modifications/recommendations, and updated to include all prescription medications, OTC medications, and supplements the patient is currently taking.  This list reflects what is contained in the patient's profile, which has also been marked as reviewed to communicate to other providers it is the most up to date version of the patient's current medication therapy.     Current Outpatient Medications:     amitriptyline (ELAVIL) 25 MG tablet, Take 1 tablet by mouth Every Night., Disp: , Rfl:     baclofen (LIORESAL) 10 MG tablet, Take 1 tablet by mouth 3 (Three) Times a Day., Disp: 90 tablet, Rfl: 11    Clenpiq 10-3.5-12 MG-GM -GM/175ML solution, , Disp: , Rfl:     EPINEPHrine 0.1 MG/0.1ML solution auto-injector, Inject  as directed As Needed., Disp: , Rfl:     galcanezumab-gnlm (EMGALITY) 120 MG/ML auto-injector pen, Inject 1 mL under the skin into the appropriate area as directed Every 28 (Twenty-Eight) Days., Disp: 1 mL, Rfl: 11     levocetirizine (XYZAL) 5 MG tablet, Take 1 tablet by mouth Every Evening., Disp: , Rfl:     loratadine (CLARITIN) 10 MG tablet, Take 1 tablet by mouth Daily., Disp: , Rfl:     multivitamin with minerals tablet tablet, , Disp: , Rfl:     NON FORMULARY, 2 (Two) Times a Day. Compounded topical cream:  Ketamine,Gabapentin,Ibuprofen, Lidocaine and Baclofen.2 times daily., Disp: , Rfl:     OnabotulinumtoxinA 200 units reconstituted solution, . PHYSICIAN TO INJECT 155 UNITS INTRAMUSCULARLY INTO HEAD, NECK AND SHOULDERS EVERY 12 WEEKS Per FDA PROTOCOL, Disp: 1 each, Rfl: 3    OXcarbazepine (TRILEPTAL) 300 MG tablet, Take 3 tablets by mouth 2 (Two) Times a Day., Disp: 540 tablet, Rfl: 3    Oxymetazoline HCl powder, , Disp: , Rfl:     pregabalin (LYRICA) 200 MG capsule, Take 1 capsule by mouth 3 (Three) Times a Day., Disp: 270 capsule, Rfl: 1    Ryaltris 665-25 MCG/ACT suspension, SPRAY 1 SPRAY TWICE A DAY BY INTRANASAL ROUTE., Disp: , Rfl:     Current Facility-Administered Medications:     OnabotulinumtoxinA 200 Units, 200 Units, Intramuscular, Q3 Months, Buck Garcia MD, 200 Units at 11/15/24 1536    Medicines reviewed by Miguel Lai, PharmD on 11/15/2024 at  3:23 PM    Drug Interactions  No relevant drug drug interactions with specialty medication.       Adverse Drug Reactions  Medication tolerability: Tolerating with no to minimal ADRs          Medication plan: Continue therapy with normal follow-up  Plan for ADR Management: patient to report      Adherence, Self-Administration, and Current Therapy Problems  Adherence related patient's specialty therapy was discussed with the patient. The Adherence segment of this outreach has been reviewed and updated.   Adherence Questions  Linked Medication(s) Assessed: Galcanezumab-gnlm (EMGALITY), OnabotulinumtoxinA  On average, how many doses/injections does the patient miss per month?: 0  What are the identified reasons for non-adherence or  missed doses? : no problems identified  What is the estimated medication adherence level?: %  Based on the patient/caregiver response and refill history, does this patient require an MTP to track adherence improvements?: no    Additional Barriers to Patient Self-Administration: none  Methods for Supporting Patient Self-Administration: n/a    Recently Close Medication Therapy Problems  No medication therapy recommendations to display  Open Medication Therapy Problems  No medication therapy recommendations to display     Goals of Therapy  Goals related to the patient's specialty therapy was discussed with the patient. The Patient Goals segment of this outreach has been reviewed and updated.    Goals Addressed Today    None          Quality of Life Assessment   Quality of Life related to the patient's enrollment in the patient management program and services provided was discussed with the patient. The QOL segment of this outreach has been reviewed and updated.   Quality of Life Improvement Scale: 7-Somewhat better    Reassessment Plan & Follow-Up  Medication Therapy Changes: n/a  Related Plans, Therapy Recommendations, or Issues to Be Addressed: n/a  Pharmacist to perform regular reassessments no more than (6) months from the previous assessment.  Care Coordinator to set up future refill outreaches, coordinate prescription delivery, and escalate clinical questions to pharmacist.     Attestation  Therapeutic appropriateness: Appropriate  I attest the patient was actively involved in and has agreed to the above plan of care. If the prescribed therapy is at any point deemed not appropriate based on the current or future assessments, a consultation will be initiated with the patient's specialty care provider to determine the best course of action. The revised plan of therapy will be documented along with any additional patient education provided. Discussed aforementioned material with patient in person.    Miguel  Joelle, PharmD, [facility] Specialty Pharmacist, Neurology  11/15/2024  16:10 EST

## 2024-11-15 NOTE — PROGRESS NOTES
Botulinum Toxin Injection Procedure    Chief Complaint   Patient presents with    Botulinum Toxin Injection     Patient supplied (200 units) Do not bill        History:  Response to treatment has reduced HA's at least 7 fewer days a month and/or 100 hours fewer each month.     Pre-operative diagnosis: headache    Post-operative diagnosis: Same    Procedure: Chemical neurolysis    After risks and benefits were explained including bleeding, infection, worsening of pain, damage to the areas being injected, weakness of muscles, loss of muscle control, dysphagia if injecting the head or neck, facial droop if injecting the facial area, painful injection, allergic or other reaction to the medications being injected, and the failure of the procedure to help the problem, a signed consent was obtained.      The patient was placed in a comfortable area and the sites to be treated were identified. A time out was called and performed. The area to be treated was prepped three times with alcohol and the alcohol allowed to dry. Next, a 27 gauge needle was used to inject the medication in the area to be treated.    Area(s) injected: frontalis muscle, semispinalis capitis muscle and temporallis muscle    Medications used: botulinum toxin 200 mu, 2 mL of saline used to dilute the botulinum toxin.      The patient did tolerate the procedure well. There were no complications.       Physical Examination    Current Treatment (Units)   Splenius Capitis 25 units on the right and 25 units on the left.   Temporalis 25 units on the right and 25 units on the left.   Frontalis 27 units on the right and 28 units on the left.   EMG Guidance none .   Complications: none .   The total amount injected in units is 155 .   The total amount wasted in units is 45 .   The total amount submitted in units is 200 .

## 2024-11-22 ENCOUNTER — HOSPITAL ENCOUNTER (OUTPATIENT)
Facility: HOSPITAL | Age: 44
Discharge: HOME | End: 2024-11-22
Payer: COMMERCIAL

## 2024-11-22 DIAGNOSIS — R92.8 ABNORMAL MAMMOGRAM: ICD-10-CM

## 2024-11-22 PROCEDURE — 76642 ULTRASOUND BREAST LIMITED: CPT | Performed by: RADIOLOGY

## 2024-11-22 PROCEDURE — 77065 DX MAMMO INCL CAD UNI: CPT | Performed by: RADIOLOGY

## 2024-11-22 PROCEDURE — 77065 DX MAMMO INCL CAD UNI: CPT

## 2024-11-22 PROCEDURE — 76642 ULTRASOUND BREAST LIMITED: CPT

## 2024-11-22 PROCEDURE — 77061 BREAST TOMOSYNTHESIS UNI: CPT | Performed by: RADIOLOGY

## 2024-11-22 PROCEDURE — G0279 TOMOSYNTHESIS, MAMMO: HCPCS

## 2024-12-11 ENCOUNTER — SPECIALTY PHARMACY (OUTPATIENT)
Dept: ONCOLOGY | Facility: HOSPITAL | Age: 44
End: 2024-12-11
Payer: COMMERCIAL

## 2024-12-11 ENCOUNTER — TELEPHONE (OUTPATIENT)
Dept: NEUROLOGY | Facility: CLINIC | Age: 44
End: 2024-12-11
Payer: COMMERCIAL

## 2024-12-11 NOTE — PROGRESS NOTES
Specialty Pharmacy Refill Coordination Note     Bev is a 44 y.o. female contacted today regarding refills of  Emgality specialty medication(s).Patient is due for injection on 12/05.    Reviewed and verified with patient:       Specialty medication(s) and dose(s) confirmed: yes    Refill Questions      Flowsheet Row Most Recent Value   Changes to allergies? No   Changes to medications? Yes  [12/11 patient stating the  has increased her Elavil 75mg at bedtime.]   New conditions or infections since last clinic visit No   Unplanned office visit, urgent care, ED, or hospital admission in the last 4 weeks  Yes  [12/11 patient stating the  has increased her Elavil 75mg at bedtime.]   How does patient/caregiver feel medication is working? Good   Financial problems or insurance changes  No   Since the previous refill, were any specialty medication doses or scheduled injections missed or delayed?  No   If yes, please provide the amount N/A   Why were doses missed? N/A   Does this patient require a clinical escalation to a pharmacist? Yes  [12/11 patient stating the  has increased her Elavil 75mg at bedtime.]            Delivery Questions      Flowsheet Row Most Recent Value   Delivery method UPS   Delivery address verified with patient/caregiver? Yes   Delivery address Home   Number of medications in delivery 1   Medication(s) being filled and delivered Galcanezumab-gnlm (EMGALITY)   Doses left of specialty medications N/A   Copay verified? Yes   Copay amount Emgality =$0   Copay form of payment No copayment ($0)   Ship Date 12/12   Delivery Date 12/13   Signature Required No                   Follow-up: 28 day(s)     Boris Babin  Specialty Pharmacy Technician

## 2024-12-11 NOTE — TELEPHONE ENCOUNTER
PATIENT CALLING ABOUT HER DOSE OF EMGALITY.  SHE WAS DUE TO TAKE LAST WEEK BUT FORGOT.    CAN SHE TAKE DOSE NOW?    PLEASE ADVISE PATIENT

## 2024-12-11 NOTE — TELEPHONE ENCOUNTER
Spoke to Bev to inform that she can go ahead and take her Emgality dose but to take the next one 28 days from time of the current injection.  She states that she currently does have a headache and will take the injection asap.

## 2025-01-03 ENCOUNTER — OFFICE VISIT (OUTPATIENT)
Dept: NEUROSURGERY | Facility: CLINIC | Age: 45
End: 2025-01-03
Payer: COMMERCIAL

## 2025-01-03 VITALS — TEMPERATURE: 97.1 F | HEIGHT: 70 IN | WEIGHT: 269 LBS | BODY MASS INDEX: 38.51 KG/M2

## 2025-01-03 DIAGNOSIS — M79.602 PAIN IN BOTH UPPER EXTREMITIES: ICD-10-CM

## 2025-01-03 DIAGNOSIS — G50.0 TRIGEMINAL NEURALGIA: ICD-10-CM

## 2025-01-03 DIAGNOSIS — M54.2 NECK PAIN: ICD-10-CM

## 2025-01-03 DIAGNOSIS — M50.30 DDD (DEGENERATIVE DISC DISEASE), CERVICAL: Primary | ICD-10-CM

## 2025-01-03 DIAGNOSIS — M79.601 PAIN IN BOTH UPPER EXTREMITIES: ICD-10-CM

## 2025-01-03 RX ORDER — AMITRIPTYLINE HYDROCHLORIDE 75 MG/1
TABLET ORAL
COMMUNITY
Start: 2024-11-21

## 2025-01-03 NOTE — PROGRESS NOTES
Patient: Bev Ferrari  : 1980    Primary Care Provider: Lan Obregon MD    Requesting Provider: As above      Chief Complaint: Neck Pain, Arm Pain (LUE), and Numbness (4th and 5th digits)      History of Present Illness: This is a 44 y.o. female who presents with 2 separate issues.  The first of which is left facial pain.  The patient is underlying this for roughly 5 years.  She has a constant dull achy pain on the left side of her face but then will get random shocks of electrical type pain in all 3 distributions of the trigeminal nerve on the left.  This pain is exacerbated by certain movements of her jaw as well as cold temperature.  She is currently on high-dose Tegretol, baclofen and Lyrica.  She says the medications help some, but she does continue to have painful episodes and she does feel quite lethargic from her medications.  She underwent a MRI scan in  which did not show any vascular compression of the trigeminal nerve.    The second issue is neck and left shoulder pain.  In talking the patient, she has been dealing with this for a couple of years now.  She has pain that starts in her neck and radiates to the left shoulder.  She also gets a pain that starts at the left elbow and goes to the last 2 digits of her hand.  She will occasionally get right extremity pain, but most of the symptoms are to the left.  She denies any specific weakness of the upper extremities.  She tried physical therapy on her neck but did not think that that helped at all.    PMHX  Allergies:  Allergies   Allergen Reactions    Azithromycin Itching    Molds & Smuts Other (See Comments)    Pollen Extract Other (See Comments)    Terbinafine Unknown - Low Severity    Hydrocodone-Acetaminophen Itching and Rash    Hydroxychloroquine Rash     Medications    Current Outpatient Medications:     amitriptyline (ELAVIL) 75 MG tablet, Take 1 tablet PO at bedtime.., Disp: , Rfl:     baclofen (LIORESAL) 10 MG tablet, Take 1  tablet by mouth 3 (Three) Times a Day., Disp: 90 tablet, Rfl: 11    EPINEPHrine 0.1 MG/0.1ML solution auto-injector, Inject  as directed As Needed., Disp: , Rfl:     galcanezumab-gnlm (EMGALITY) 120 MG/ML auto-injector pen, Inject 1 mL under the skin into the appropriate area as directed Every 28 (Twenty-Eight) Days., Disp: 1 mL, Rfl: 11    levocetirizine (XYZAL) 5 MG tablet, Take 1 tablet by mouth Every Evening., Disp: , Rfl:     loratadine (CLARITIN) 10 MG tablet, Take 1 tablet by mouth Daily., Disp: , Rfl:     multivitamin with minerals tablet tablet, , Disp: , Rfl:     NON FORMULARY, 2 (Two) Times a Day. Compounded topical cream:  Ketamine,Gabapentin,Ibuprofen, Lidocaine and Baclofen.2 times daily., Disp: , Rfl:     OnabotulinumtoxinA 200 units reconstituted solution, . PHYSICIAN TO INJECT 155 UNITS INTRAMUSCULARLY INTO HEAD, NECK AND SHOULDERS EVERY 12 WEEKS Per FDA PROTOCOL, Disp: 1 each, Rfl: 3    OXcarbazepine (TRILEPTAL) 300 MG tablet, Take 3 tablets by mouth 2 (Two) Times a Day., Disp: 540 tablet, Rfl: 3    Oxymetazoline HCl powder, , Disp: , Rfl:     pregabalin (LYRICA) 200 MG capsule, Take 1 capsule by mouth 3 (Three) Times a Day., Disp: 270 capsule, Rfl: 1    Ryaltris 665-25 MCG/ACT suspension, SPRAY 1 SPRAY TWICE A DAY BY INTRANASAL ROUTE., Disp: , Rfl:     Current Facility-Administered Medications:     OnabotulinumtoxinA 200 Units, 200 Units, Intramuscular, Q3 Months, Buck Garcia MD, 200 Units at 11/15/24 1536  Past Medical History:  Past Medical History:   Diagnosis Date    ADHD (attention deficit hyperactivity disorder)     Anxiety     Arthritis     Bowel trouble     Cervical disc disorder     Chronic mental illness     Chronic pain disorder     Cluster headache     CTS (carpal tunnel syndrome)     Depression     Difficulty walking     Extremity pain     Fibromyalgia, primary     H/O seasonal allergies     Headache     Headache, tension-type     Hearing loss     HPV in  female     Joint pain     Low back pain     Lumbosacral disc disease     Neck pain     Osteoarthritis     Periodic headache syndrome, not intractable 2022    Psychiatric illness     PTSD (post-traumatic stress disorder)     Sjogren's syndrome     Trigeminal neuralgia     Vision loss      Past Surgical History:  Past Surgical History:   Procedure Laterality Date    CERVICAL BIOPSY      D & C FIRST TRIMESTER / TX INCOMPLETE / MISSED / SEPTIC / INDUCED   2000    DENTAL PROCEDURE      LEEP      ROOT CANAL       Social Hx:  Social History     Tobacco Use    Smoking status: Never     Passive exposure: Never    Smokeless tobacco: Never    Tobacco comments:     2nd hand exposure to cigarettes and vaping.   Vaping Use    Vaping status: Never Used   Substance Use Topics    Alcohol use: Not Currently     Comment: Stopped all alcohol when started on [place] meds.    Drug use: Never     Family Hx:  Family History   Problem Relation Age of Onset    Dementia Mother     Cancer Mother         Skin    Skin cancer Mother     Bipolar disorder Mother     Arthritis Mother      problems Mother     Mental illness Mother         Diagnosed with Bipolar Type I w/ almaz.    Parkinsonism Mother     Anxiety disorder Mother     Hearing loss Mother     Vision loss Mother         Hx of cataract extraction.    Diabetes Father         Prediabetes    Hypertension Father     Cancer Father         Prostate    Prostate cancer Father     Heart disease Father         CAD with stent.    High cholesterol Father     Obesity Father     Arthritis Father     Coronary artery disease Father     Hyperlipidemia Father     Hearing loss Father     Vision loss Father         Hx of cataract extraction..    Depression Sister     Multiple sclerosis Sister     Multiple sclerosis Sister     Depression Sister         THC use.    Drug abuse Sister         THC use.    Vision loss Sister         Glasses/contacts.    Epilepsy Brother     Mental retardation  Brother     Seizures Brother     Developmental delay Brother     Early death Brother         Aged 41;  from complications w/ pneumonia in .    Birth defects Brother     Developmental Disability Brother     Learning disabilities Brother         Autism and mental retardation.    Breast cancer Maternal Grandmother     Alcohol abuse Maternal Grandfather     Vision loss Brother         Glasses/contacts.    Ovarian cancer Neg Hx      Review of Systems:        Review of Systems   Constitutional:  Negative for activity change, appetite change, chills, diaphoresis, fatigue, fever and unexpected weight change.   HENT:  Negative for congestion, dental problem, drooling, ear discharge, ear pain, facial swelling, hearing loss, mouth sores, nosebleeds, postnasal drip, rhinorrhea, sinus pressure, sinus pain, sneezing, sore throat, tinnitus, trouble swallowing and voice change.    Eyes:  Negative for photophobia, pain, discharge, redness, itching and visual disturbance.   Respiratory:  Negative for apnea, cough, choking, chest tightness, shortness of breath, wheezing and stridor.    Cardiovascular:  Negative for chest pain, palpitations and leg swelling.   Gastrointestinal:  Negative for abdominal distention, abdominal pain, anal bleeding, blood in stool, constipation, diarrhea, nausea, rectal pain and vomiting.   Endocrine: Negative for cold intolerance, heat intolerance, polydipsia, polyphagia and polyuria.   Genitourinary:  Negative for decreased urine volume, difficulty urinating, dysuria, enuresis, flank pain, frequency, genital sores, hematuria and urgency.   Musculoskeletal:  Positive for neck pain. Negative for arthralgias, back pain, gait problem, joint swelling, myalgias and neck stiffness.   Skin:  Negative for color change, pallor, rash and wound.   Allergic/Immunologic: Negative for environmental allergies, food allergies and immunocompromised state.   Neurological:  Positive for numbness. Negative for  "dizziness, tremors, seizures, syncope, facial asymmetry, speech difficulty, weakness, light-headedness and headaches.   Hematological:  Negative for adenopathy. Does not bruise/bleed easily.   Psychiatric/Behavioral:  Negative for agitation, behavioral problems, confusion, decreased concentration, dysphoric mood, hallucinations, self-injury, sleep disturbance and suicidal ideas. The patient is not nervous/anxious and is not hyperactive.    All other systems reviewed and are negative.       Physical Exam:   Temp 97.1 °F (36.2 °C) (Infrared)   Ht 177.8 cm (70\")   Wt 122 kg (269 lb)   BMI 38.60 kg/m²   Awake, alert and oriented x 3  Speech f/c  Opens eyes spont  Pupils 3 mm rx bilaterally  Extraocular muscles intact bilaterally  Normal sensation to light touch in all 3 distributions of CN V bilaterally  Face symmetric bilaterally  Tongue midline  5/5 in all 4 ext  Normal sensation to light touch in all 4 ext  2+DTR's  No guajardo's or clonus bilaterally  No pronator drift or dysmetria  Gait not assessed    Diagnostic Studies:  All neurological imaging studies were independently reviewed unless stated otherwise    Assessment/Plan:  This is a 44 y.o. female presenting with 2 separate issues.  With regards the patient's facial pain, I think she has type II trigeminal neuralgia.  [facility] to evaluate any vascular compression given that it has been 4 years since her last scan.  If this does not show any obvious compression, I do think she was a good candidate for CyberKnife treatment to the trigeminal nerve.    With regards the patient's neck and arm issues, her cervical MRI shows moderate degenerative changes at C5-6 and C6-7, but these mostly favor the right side.  For better evaluation of her left arm symptoms, I would like to obtain upper extremity EMGs as I think she likely has a component of ulnar neuropathy.  We will have the patient follow with me after her new brain MRI and " EMGs to discuss the results.    Diagnoses and all orders for this visit:    1. DDD (degenerative disc disease), cervical (Primary)  -     EMG & Nerve Conduction Test    2. Neck pain  -     EMG & Nerve Conduction Test    3. Pain in both upper extremities  Comments:  LUE>RUE  Orders:  -     EMG & Nerve Conduction Test    4. Trigeminal neuralgia  -     MRI Brain With & Without Contrast; Future      Bev Ferrari  reports that she has never smoked. She has never been exposed to tobacco smoke. She has never used smokeless tobacco.            Jeremiah Maria MD  01/03/25  10:26 EST

## 2025-01-14 ENCOUNTER — SPECIALTY PHARMACY (OUTPATIENT)
Dept: ONCOLOGY | Facility: HOSPITAL | Age: 45
End: 2025-01-14
Payer: COMMERCIAL

## 2025-01-16 ENCOUNTER — HOSPITAL ENCOUNTER (OUTPATIENT)
Dept: NEUROLOGY | Facility: HOSPITAL | Age: 45
Discharge: HOME | End: 2025-01-16
Admitting: STUDENT IN AN ORGANIZED HEALTH CARE EDUCATION/TRAINING PROGRAM
Payer: COMMERCIAL

## 2025-01-16 PROCEDURE — 95886 MUSC TEST DONE W/N TEST COMP: CPT

## 2025-01-16 PROCEDURE — 95910 NRV CNDJ TEST 7-8 STUDIES: CPT

## 2025-01-18 ENCOUNTER — HOSPITAL ENCOUNTER (OUTPATIENT)
Dept: MRI IMAGING | Facility: HOSPITAL | Age: 45
Discharge: HOME | End: 2025-01-18
Payer: COMMERCIAL

## 2025-01-18 DIAGNOSIS — G50.0 TRIGEMINAL NEURALGIA: ICD-10-CM

## 2025-01-18 PROCEDURE — A9577 INJ MULTIHANCE: HCPCS | Performed by: STUDENT IN AN ORGANIZED HEALTH CARE EDUCATION/TRAINING PROGRAM

## 2025-01-18 PROCEDURE — 70553 MRI BRAIN STEM W/O & W/DYE: CPT

## 2025-01-18 PROCEDURE — 25510000002 GADOBENATE DIMEGLUMINE 529 MG/ML SOLUTION: Performed by: STUDENT IN AN ORGANIZED HEALTH CARE EDUCATION/TRAINING PROGRAM

## 2025-01-18 RX ADMIN — GADOBENATE DIMEGLUMINE 20 ML: 529 INJECTION, SOLUTION INTRAVENOUS at 13:52

## 2025-01-24 ENCOUNTER — OFFICE VISIT (OUTPATIENT)
Dept: NEUROSURGERY | Facility: CLINIC | Age: 45
End: 2025-01-24
Payer: COMMERCIAL

## 2025-01-24 VITALS — WEIGHT: 243 LBS | TEMPERATURE: 97.1 F | HEIGHT: 70 IN | BODY MASS INDEX: 34.79 KG/M2

## 2025-01-24 DIAGNOSIS — M50.30 DDD (DEGENERATIVE DISC DISEASE), CERVICAL: ICD-10-CM

## 2025-01-24 DIAGNOSIS — M54.2 NECK PAIN: Primary | ICD-10-CM

## 2025-01-24 DIAGNOSIS — M79.602 PAIN IN BOTH UPPER EXTREMITIES: ICD-10-CM

## 2025-01-24 DIAGNOSIS — M79.601 PAIN IN BOTH UPPER EXTREMITIES: ICD-10-CM

## 2025-01-24 DIAGNOSIS — G50.0 TRIGEMINAL NEURALGIA: ICD-10-CM

## 2025-01-24 NOTE — PROGRESS NOTES
"NEUROSURGERY PROGRESS NOTE    Patient: Bev Ferrari  : 1980    Primary Care Provider: Lan Obregon MD    Chief Complaint: Facial pain and left arm pain    Subjective: This is a 44-year-old female who I previously evaluated for 2 separate issues.  The first of which was left-sided facial pain which is most consistent with type II trigeminal neuralgia.  The patient is been managing this with medications and states has not been any significant change in her symptoms since her last appointment.  I sent her for a new brain MRI which shows no vascular compression of the left trigeminal nerve.  The other issue was left arm pain.  I sent her for an EMG and she returns today for follow-up.  Her EMG confirms an ulnar neuropathy with no evidence of cervical radiculopathy.  She states that the arm does not bother her too much except when she bends her elbow.  She feels like her arm symptoms are low on the priority and are not too bothersome at this time.    Objective    Vital Signs: Temperature 97.1 °F (36.2 °C), temperature source Infrared, height 177.8 cm (70\"), weight 110 kg (243 lb), last menstrual period 2024, not currently breastfeeding.    Physical Exam  Awake, alert and oriented x 3  Opens eyes spont  Pupils 3 mm rx bilat  Extraocular muscles intact bilaterally  Face symmetric bilaterally  Tongue midline  5/5 in all 4 ext    Current Medications:   Current Outpatient Medications:     amitriptyline (ELAVIL) 75 MG tablet, Take 1 tablet PO at bedtime.., Disp: , Rfl:     baclofen (LIORESAL) 10 MG tablet, Take 1 tablet by mouth 3 (Three) Times a Day., Disp: 90 tablet, Rfl: 11    EPINEPHrine 0.1 MG/0.1ML solution auto-injector, Inject  as directed As Needed., Disp: , Rfl:     galcanezumab-gnlm (EMGALITY) 120 MG/ML auto-injector pen, Inject 1 mL under the skin into the appropriate area as directed Every 28 (Twenty-Eight) Days., Disp: 1 mL, Rfl: 11    levocetirizine (XYZAL) 5 MG tablet, Take 1 tablet by " mouth Every Evening., Disp: , Rfl:     loratadine (CLARITIN) 10 MG tablet, Take 1 tablet by mouth Daily., Disp: , Rfl:     multivitamin with minerals tablet tablet, , Disp: , Rfl:     NON FORMULARY, 2 (Two) Times a Day. Compounded topical cream:  Ketamine,Gabapentin,Ibuprofen, Lidocaine and Baclofen.2 times daily., Disp: , Rfl:     OnabotulinumtoxinA 200 units reconstituted solution, . PHYSICIAN TO INJECT 155 UNITS INTRAMUSCULARLY INTO HEAD, NECK AND SHOULDERS EVERY 12 WEEKS Per FDA PROTOCOL, Disp: 1 each, Rfl: 3    OXcarbazepine (TRILEPTAL) 300 MG tablet, Take 3 tablets by mouth 2 (Two) Times a Day., Disp: 540 tablet, Rfl: 3    Oxymetazoline HCl powder, , Disp: , Rfl:     pregabalin (LYRICA) 200 MG capsule, Take 1 capsule by mouth 3 (Three) Times a Day., Disp: 270 capsule, Rfl: 1    Ryaltris 665-25 MCG/ACT suspension, SPRAY 1 SPRAY TWICE A DAY BY INTRANASAL ROUTE., Disp: , Rfl:     Current Facility-Administered Medications:     OnabotulinumtoxinA 200 Units, 200 Units, Intramuscular, Q3 Months, Buck Garcia MD, 200 Units at 11/15/24 1536     Laboratory Results:                              Brief Urine Lab Results       None          Microbiology Results (last 10 days)       ** No results found for the last 240 hours. **            Diagnostic Imaging: I reviewed and independently interpreted the new imaging.     Assessment/Plan:  This is a 44-year-old female who I have previously evaluated for 2 separate issues.  With regards the patient's facial pain, I do think it is most consistent with type II trigeminal neuralgia.  Her new brain MRI does not show any vascular compression of the trigeminal nerve.  As such, I do not have his role for surgical intervention, but I did discuss the potential of CyberKnife treatment to the nerve.  I reviewed the risks, benefits and alternatives and explained I think it has a reasonable chance of helping her pain.  The patient would like to think about her  options before meeting with radiation oncology.  We will place a referral if she calls back and if  wants to discuss treatment options.  With regards the patient's left arm pain, her EMG confirms an ulnar neuropathy with no evidence of cervical radiculopathy.  She remains full strength on exam.  The patient states that her arm is not that bothersome and she is not interested in pursuing any type of treatment at this time.  As such, we will not schedule a further follow-up, but will be available if the patient decides she wants to proceed with CyberKnife treatment to her trigeminal nerve.    Diagnoses and all orders for this visit:    1. Neck pain (Primary)    2. Pain in both upper extremities    3. DDD (degenerative disc disease), cervical    4. Trigeminal neuralgia      Bev CLAUDIO Vega  reports that she has never smoked. She has never been exposed to tobacco smoke. She has never used smokeless tobacco.       Jeremiah Maria MD  01/24/25  10:08 EST

## 2025-02-04 DIAGNOSIS — G50.0 TRIGEMINAL NEURALGIA: Chronic | ICD-10-CM

## 2025-02-04 RX ORDER — PREGABALIN 200 MG/1
200 CAPSULE ORAL 3 TIMES DAILY
Qty: 270 CAPSULE | Refills: 0 | Status: SHIPPED | OUTPATIENT
Start: 2025-02-04

## 2025-02-04 NOTE — TELEPHONE ENCOUNTER
Rx Refill Note  Requested Prescriptions     Pending Prescriptions Disp Refills    pregabalin (LYRICA) 200 MG capsule [Pharmacy Med Name: PREGABALIN 200 MG CAPSULE] 270 capsule      Sig: TAKE 1 CAPSULE BY MOUTH 3 TIMES A DAY      Last filled:  08/07/24  Last office visit with prescribing clinician: 11.15.24 procedure     Next office visit with prescribing clinician: 02/21/25    Veronika Montes MA  02/04/25, 10:58 EST

## 2025-02-07 ENCOUNTER — SPECIALTY PHARMACY (OUTPATIENT)
Dept: ONCOLOGY | Facility: HOSPITAL | Age: 45
End: 2025-02-07
Payer: COMMERCIAL

## 2025-02-07 NOTE — PROGRESS NOTES
Specialty Pharmacy Refill Coordination Note     Bev is a 44 y.o. female contacted today regarding refills of  Emgality specialty medication(s).Patient is due for injection on 02/25.    Reviewed and verified with patient:       Specialty medication(s) and dose(s) confirmed: yes    Refill Questions      Flowsheet Row Most Recent Value   Changes to allergies? No   Changes to medications? No   New conditions or infections since last clinic visit No   Unplanned office visit, urgent care, ED, or hospital admission in the last 4 weeks  No   How does patient/caregiver feel medication is working? Good   Financial problems or insurance changes  No   Since the previous refill, were any specialty medication doses or scheduled injections missed or delayed?  No   If yes, please provide the amount N/A   Why were doses missed? N/A   Does this patient require a clinical escalation to a pharmacist? No            Delivery Questions      Flowsheet Row Most Recent Value   Delivery method UPS   Delivery address verified with patient/caregiver? Yes   Delivery address Home   Number of medications in delivery 1   Medication(s) being filled and delivered Galcanezumab-gnlm (EMGALITY)   Doses left of specialty medications N/A   Copay verified? Yes   Copay amount Emgality =$35.00 copay   Copay form of payment Credit/debit on file   Ship Date 02/10   Delivery Date Selection 02/11/25   Signature Required No                   Follow-up: 28 day(s)     Boris Babin  Specialty Pharmacy Technician

## 2025-02-21 ENCOUNTER — PROCEDURE VISIT (OUTPATIENT)
Dept: NEUROLOGY | Facility: CLINIC | Age: 45
End: 2025-02-21
Payer: COMMERCIAL

## 2025-02-21 DIAGNOSIS — G43.709 CHRONIC MIGRAINE WITHOUT AURA WITHOUT STATUS MIGRAINOSUS, NOT INTRACTABLE: Primary | Chronic | ICD-10-CM

## 2025-03-12 ENCOUNTER — SPECIALTY PHARMACY (OUTPATIENT)
Dept: ONCOLOGY | Facility: HOSPITAL | Age: 45
End: 2025-03-12
Payer: COMMERCIAL

## 2025-03-12 NOTE — PROGRESS NOTES
Specialty Pharmacy Patient Management Program  Refill Outreach     Bev was contacted today regarding refills of their medication(s).    Refill Questions      Flowsheet Row Most Recent Value   Changes to allergies? No   Changes to medications? No   New conditions or infections since last clinic visit No   Unplanned office visit, urgent care, ED, or hospital admission in the last 4 weeks  No   How does patient/caregiver feel medication is working? Good   Financial problems or insurance changes  No   Since the previous refill, were any specialty medication doses or scheduled injections missed or delayed?  No   If yes, please provide the amount N/A   Why were doses missed? N/A   Does this patient require a clinical escalation to a pharmacist? No            Delivery Questions      Flowsheet Row Most Recent Value   Delivery method UPS   Delivery address verified with patient/caregiver? Yes   Delivery address Home   Number of medications in delivery 1   Medication(s) being filled and delivered Galcanezumab-gnlm (EMGALITY)   Doses left of specialty medications N/A   Copay verified? Yes   Copay amount Emgality =$35.00   Copay form of payment Credit/debit on file   Delivery Date Selection 03/14/25   Signature Required No                 Follow-up: 28 day(s)     Boris Babin  3/12/2025  14:43 EDT

## 2025-03-25 ENCOUNTER — SPECIALTY PHARMACY (OUTPATIENT)
Dept: ONCOLOGY | Facility: HOSPITAL | Age: 45
End: 2025-03-25
Payer: COMMERCIAL

## 2025-04-11 ENCOUNTER — SPECIALTY PHARMACY (OUTPATIENT)
Dept: ONCOLOGY | Facility: HOSPITAL | Age: 45
End: 2025-04-11
Payer: COMMERCIAL

## 2025-04-11 NOTE — PROGRESS NOTES
Specialty Pharmacy Patient Management Program  Refill Outreach     Bev was contacted today regarding refills of their medication(s).    Refill Questions      Flowsheet Row Most Recent Value   Changes to allergies? No   Changes to medications? No   New conditions or infections since last clinic visit No   Unplanned office visit, urgent care, ED, or hospital admission in the last 4 weeks  No   How does patient/caregiver feel medication is working? Good   Financial problems or insurance changes  No   Since the previous refill, were any specialty medication doses or scheduled injections missed or delayed?  No   If yes, please provide the amount N/A   Why were doses missed? N/A   Does this patient require a clinical escalation to a pharmacist? No            Delivery Questions      Flowsheet Row Most Recent Value   Delivery method UPS   Delivery address verified with patient/caregiver? Yes   Delivery address Home   Number of medications in delivery 2   Medication(s) being filled and delivered Galcanezumab-gnlm (EMGALITY), OnabotulinumtoxinA   Doses left of specialty medications N/A   Copay verified? Yes   Copay amount Emgality =$35.00 copay   Copay form of payment Credit/debit on file   Delivery Date Selection 04/15/25   Signature Required No   Do you consent to receive electronic handouts?  Yes                 Follow-up: 28 day(s)     Boris Babin  4/11/2025  08:59 EDT

## 2025-05-08 ENCOUNTER — SPECIALTY PHARMACY (OUTPATIENT)
Dept: ONCOLOGY | Facility: HOSPITAL | Age: 45
End: 2025-05-08
Payer: COMMERCIAL

## 2025-05-08 RX ORDER — ASCORBIC ACID 500 MG
500 TABLET ORAL DAILY
COMMUNITY

## 2025-05-08 RX ORDER — ASPIRIN 81 MG/1
81 TABLET, COATED ORAL DAILY
COMMUNITY
Start: 2025-05-01 | End: 2025-05-29

## 2025-05-08 RX ORDER — AMITRIPTYLINE HYDROCHLORIDE 50 MG/1
50 TABLET ORAL NIGHTLY
COMMUNITY
Start: 2025-02-17

## 2025-05-08 RX ORDER — DOCUSATE SODIUM 100 MG/1
100 CAPSULE, LIQUID FILLED ORAL DAILY
COMMUNITY

## 2025-05-08 NOTE — PROGRESS NOTES
Specialty Pharmacy Patient Management Program  Neurology Reassessment     Bev Ferrari is a 44 y.o. female with migraines seen by a Neurology provider and enrolled in the Neurology Patient Management program offered by [facility].  A follow-up outreach was conducted, including assessment of continued therapy appropriateness, medication adherence, and side effect incidence and management for emgality 120 mg/mL and botox in office injections.     Changes to Insurance Coverage or Financial Support  No changes noted     Relevant Past Medical History and Comorbidities  Relevant medical history and concomitant health conditions were discussed with the patient. The patient's chart has been reviewed for relevant past medical history and comorbid health conditions and updated as necessary.   Past Medical History:   Diagnosis Date    ADHD (attention deficit hyperactivity disorder)     Anxiety     Arthritis     Bowel trouble     Cervical disc disorder     Chronic mental illness     Chronic pain disorder     Cluster headache     CTS (carpal tunnel syndrome)     Depression     Difficulty walking     Extremity pain     Fibromyalgia, primary     H/O seasonal allergies     Headache     Headache, tension-type     Hearing loss     HPV in female     Joint pain     Low back pain     Lumbosacral disc disease     Neck pain     Osteoarthritis     Periodic headache syndrome, not intractable 08/12/2022    Psychiatric illness     PTSD (post-traumatic stress disorder)     Sjogren's syndrome     Trigeminal neuralgia     Vision loss      Social History     Socioeconomic History    Marital status:    Tobacco Use    Smoking status: Never     Passive exposure: Never    Smokeless tobacco: Never    Tobacco comments:     2nd hand exposure to cigarettes and vaping.   Vaping Use    Vaping status: Never Used   Substance and Sexual Activity    Alcohol use: Not Currently     Comment: Stopped all alcohol when started on  [place] meds.    Drug use: Never    Sexual activity: Yes     Partners: Male     Birth control/protection: Condom, Other     Comment: Pull-out method.     Problem list reviewed by Miguel Lai, PharmD on 5/8/2025 at 10:30 AM    Hospitalizations and Urgent Care Since Last Assessment  ED Visits, Admissions, or Hospitalizations: outpatient knee surgery   Urgent Office Visits: none     Allergies  Known allergies and reactions were discussed with the patient. The patient's chart has been reviewed for allergy information and updated as necessary.   Allergies   Allergen Reactions    Azithromycin Itching    Molds & Smuts Other (See Comments)    Pollen Extract Other (See Comments)    Terbinafine Unknown - Low Severity    Hydrocodone-Acetaminophen Itching and Rash    Hydroxychloroquine Rash     Allergies reviewed by Miguel Lai, PharmD on 5/8/2025 at  9:54 AM    Relevant Laboratory Values      Lab Assessment    Current Medication List  This medication list has been reviewed with the patient and evaluated for any interactions or necessary modifications/recommendations, and updated to include all prescription medications, OTC medications, and supplements the patient is currently taking.  This list reflects what is contained in the patient's profile, which has also been marked as reviewed to communicate to other providers it is the most up to date version of the patient's current medication therapy.     Current Outpatient Medications:     amitriptyline (ELAVIL) 50 MG tablet, Take 1 tablet by mouth Every Night., Disp: , Rfl:     ascorbic acid (VITAMIN C) 500 MG tablet, Take 1 tablet by mouth Daily., Disp: , Rfl:     Aspirin Low Dose 81 MG EC tablet, Take 1 tablet by mouth Daily. 30 days s/p ortho surgery, Disp: , Rfl:     baclofen (LIORESAL) 10 MG tablet, Take 1 tablet by mouth 3 (Three) Times a Day., Disp: 90 tablet, Rfl: 11    EPINEPHrine 0.1 MG/0.1ML solution auto-injector, Inject  as directed As Needed., Disp: ,  Rfl:     galcanezumab-gnlm (EMGALITY) 120 MG/ML auto-injector pen, Inject 1 mL under the skin into the appropriate area as directed Every 28 (Twenty-Eight) Days., Disp: 1 mL, Rfl: 11    levocetirizine (XYZAL) 5 MG tablet, Take 1 tablet by mouth Every Evening., Disp: , Rfl:     loratadine (CLARITIN) 10 MG tablet, Take 1 tablet by mouth Daily., Disp: , Rfl:     multivitamin with minerals tablet tablet, , Disp: , Rfl:     NON FORMULARY, 2 (Two) Times a Day. Compounded topical cream:  Ketamine,Gabapentin,Ibuprofen, Lidocaine and Baclofen.2 times daily., Disp: , Rfl:     OnabotulinumtoxinA 200 units reconstituted solution, . PHYSICIAN TO INJECT 155 UNITS INTRAMUSCULARLY INTO HEAD, NECK AND SHOULDERS EVERY 12 WEEKS Per FDA PROTOCOL, Disp: 1 each, Rfl: 3    OXcarbazepine (TRILEPTAL) 300 MG tablet, Take 3 tablets by mouth 2 (Two) Times a Day., Disp: 540 tablet, Rfl: 3    pregabalin (LYRICA) 200 MG capsule, TAKE 1 CAPSULE BY MOUTH 3 TIMES A DAY, Disp: 270 capsule, Rfl: 0    Ryaltris 665-25 MCG/ACT suspension, SPRAY 1 SPRAY TWICE A DAY BY INTRANASAL ROUTE., Disp: , Rfl:     docusate sodium (COLACE) 100 MG capsule, Take 1 capsule by mouth Daily., Disp: , Rfl:     Oxymetazoline HCl powder, , Disp: , Rfl:     Current Facility-Administered Medications:     OnabotulinumtoxinA 200 Units, 200 Units, Intramuscular, Q3 Months, Buck Garcia MD, 200 Units at 02/21/25 1549    Medicines reviewed by Miguel Lai, PharmD on 5/8/2025 at 10:00 AM    Drug Interactions  No relevant drug drug interactions with specialty medication.       Adverse Drug Reactions  Medication tolerability: Tolerating with no to minimal ADRs          Medication plan: Continue therapy with normal follow-up  Plan for ADR Management: patient to report      Adherence, Self-Administration, and Current Therapy Problems  Adherence related patient's specialty therapy was discussed with the patient. The Adherence segment of this outreach has  been reviewed and updated.   Adherence Questions  Linked Medication(s) Assessed: Galcanezumab-gnlm (EMGALITY), OnabotulinumtoxinA  On average, how many doses/injections does the patient miss per month?: 0 (one delated dose.)  What are the identified reasons for non-adherence or missed doses? : no problems identified  What is the estimated medication adherence level?: %  Based on the patient/caregiver response and refill history, does this patient require an MTP to track adherence improvements?: no    Additional Barriers to Patient Self-Administration: none  Methods for Supporting Patient Self-Administration: n/a    Recently Close Medication Therapy Problems  No medication therapy recommendations to display  Open Medication Therapy Problems  No medication therapy recommendations to display     Goals of Therapy  Goals related to the patient's specialty therapy was discussed with the patient. The Patient Goals segment of this outreach has been reviewed and updated.    Goals Addressed Today        Specialty Pharmacy General Goal      Decrease frequency, severity and duration of migraine attacks by 25%    On Average, Reduce:   Frequency of migraines to < 15 per month.   Duration of migraines to < 2 hours.     Baseline Values/Notes on Enrollment  Frequency:  almost daily  Symptom Severity:  6-8/10  Duration: hours    Date of Reassessment Notes on Progress Toward Above Goals   5/8/25 Botox and embality help reduce breakthrough HA and photophobia                                            Quality of Life Assessment   Quality of Life related to the patient's enrollment in the patient management program and services provided was discussed with the patient. The QOL segment of this outreach has been reviewed and updated.   Quality of Life Improvement Scale: 8-Moderately better    Reassessment Plan & Follow-Up  Medication Therapy Changes: n/a  Related Plans, Therapy Recommendations, or Issues to Be Addressed:  n/a  Pharmacist to perform regular reassessments no more than (6) months from the previous assessment.  Care Coordinator to set up future refill outreaches, coordinate prescription delivery, and escalate clinical questions to pharmacist.     Attestation  Therapeutic appropriateness: Appropriate  I attest the patient was actively involved in and has agreed to the above plan of care. If the prescribed therapy is at any point deemed not appropriate based on the current or future assessments, a consultation will be initiated with the patient's specialty care provider to determine the best course of action. The revised plan of therapy will be documented along with any additional patient education provided. Discussed aforementioned material with patient by phone.    Miguel Lai, PharmD, [facility]  Clinic Specialty Pharmacist, Neurology  5/8/2025  10:32 EDT

## 2025-05-08 NOTE — PROGRESS NOTES
Specialty Pharmacy Refill Coordination Note     Bev is a 44 y.o. female contacted today regarding refills of her specialty medication(s).    Specialty medication(s) and dose(s) confirmed: emgality 120 mg/mL  Changes to medications: no  Changes to insurance: no  Reviewed and verified with patient:  Allergies  Meds  Problems         Refill Questions      Flowsheet Row Most Recent Value   Changes to allergies? No   Changes to medications? No   New conditions or infections since last clinic visit No  [she is s/p knee sruger]   Unplanned office visit, urgent care, ED, or hospital admission in the last 4 weeks  No   How does patient/caregiver feel medication is working? Good   Financial problems or insurance changes  No   Since the previous refill, were any specialty medication doses or scheduled injections missed or delayed?  No   Why were doses missed? one delayed dose, reset the cycle to new day of the week.   Does this patient require a clinical escalation to a pharmacist? No            Delivery Questions      Flowsheet Row Most Recent Value   Delivery method UPS   Delivery address verified with patient/caregiver? Yes   Delivery address Home   Number of medications in delivery 1  [due on the 28th of May]   Medication(s) being filled and delivered Galcanezumab-gnlm (EMGALITY)   Doses left of specialty medications 1   Copay amount 35$   Copay form of payment Credit/debit on file   Delivery Date Selection 05/09/25   Signature Required No                 Follow-up: 4 week(s)     Miguel Lai, PharmD  5/8/2025   10:32 EDT

## 2025-05-19 DIAGNOSIS — G50.0 TRIGEMINAL NEURALGIA: Chronic | ICD-10-CM

## 2025-05-19 RX ORDER — PREGABALIN 200 MG/1
200 CAPSULE ORAL 3 TIMES DAILY
Qty: 270 CAPSULE | Refills: 1 | Status: SHIPPED | OUTPATIENT
Start: 2025-05-19

## 2025-05-19 NOTE — TELEPHONE ENCOUNTER
Rx Refill Note  Requested Prescriptions     Pending Prescriptions Disp Refills    pregabalin (LYRICA) 200 MG capsule [Pharmacy Med Name: PREGABALIN 200 MG CAPSULE] 270 capsule      Sig: TAKE 1 CAPSULE BY MOUTH 3 TIMES A DAY      Last filled:  02/04/25 w/0  Last office visit with prescribing clinician: 6/7/2024      Next office visit with prescribing clinician: 5/30/2025     Veronika Montes MA  05/19/25, 13:20 EDT

## 2025-05-29 ENCOUNTER — SPECIALTY PHARMACY (OUTPATIENT)
Dept: ONCOLOGY | Facility: HOSPITAL | Age: 45
End: 2025-05-29
Payer: COMMERCIAL

## 2025-05-29 NOTE — PROGRESS NOTES
Specialty Pharmacy Patient Management Program  Refill Outreach     Bev was contacted today regarding refills of their medication(s).    Refill Questions      Flowsheet Row Most Recent Value   Changes to allergies? No   Changes to medications? No   New conditions or infections since last clinic visit No   Unplanned office visit, urgent care, ED, or hospital admission in the last 4 weeks  No   How does patient/caregiver feel medication is working? Good   Financial problems or insurance changes  No   Since the previous refill, were any specialty medication doses or scheduled injections missed or delayed?  No   If yes, please provide the amount N/A   Why were doses missed? N/A   Does this patient require a clinical escalation to a pharmacist? No            Delivery Questions      Flowsheet Row Most Recent Value   Delivery method UPS   Delivery address verified with patient/caregiver? Yes   Delivery address Home   Other address preferred N/A   Number of medications in delivery 1   Medication(s) being filled and delivered Galcanezumab-gnlm (EMGALITY)   Doses left of specialty medications N/A   Copay verified? Yes   Copay amount Emgality =$35.00 copay   Copay form of payment Credit/debit on file   Delivery Date Selection 06/03/25   Signature Required No   Do you consent to receive electronic handouts?  Yes                 Follow-up: 28 day(s)     Boris Babin  5/29/2025  13:29 EDT

## 2025-05-30 ENCOUNTER — PROCEDURE VISIT (OUTPATIENT)
Dept: NEUROLOGY | Facility: CLINIC | Age: 45
End: 2025-05-30
Payer: COMMERCIAL

## 2025-05-30 DIAGNOSIS — G43.709 CHRONIC MIGRAINE WITHOUT AURA WITHOUT STATUS MIGRAINOSUS, NOT INTRACTABLE: Primary | Chronic | ICD-10-CM

## 2025-06-05 ENCOUNTER — SPECIALTY PHARMACY (OUTPATIENT)
Dept: NEUROLOGY | Facility: CLINIC | Age: 45
End: 2025-06-05
Payer: COMMERCIAL

## 2025-06-19 ENCOUNTER — TELEPHONE (OUTPATIENT)
Age: 45
End: 2025-06-19

## 2025-06-19 ENCOUNTER — OFFICE VISIT (OUTPATIENT)
Age: 45
End: 2025-06-19
Payer: COMMERCIAL

## 2025-06-19 ENCOUNTER — RESULTS FOLLOW-UP (OUTPATIENT)
Age: 45
End: 2025-06-19

## 2025-06-19 VITALS
BODY MASS INDEX: 37.71 KG/M2 | WEIGHT: 263.4 LBS | TEMPERATURE: 97.9 F | HEART RATE: 121 BPM | DIASTOLIC BLOOD PRESSURE: 76 MMHG | HEIGHT: 70 IN | SYSTOLIC BLOOD PRESSURE: 142 MMHG

## 2025-06-19 DIAGNOSIS — G50.0 TRIGEMINAL NEURALGIA: Chronic | ICD-10-CM

## 2025-06-19 DIAGNOSIS — R53.83 OTHER FATIGUE: ICD-10-CM

## 2025-06-19 DIAGNOSIS — M25.50 ARTHRALGIA OF MULTIPLE SITES: ICD-10-CM

## 2025-06-19 DIAGNOSIS — M79.7 FIBROMYALGIA: Primary | ICD-10-CM

## 2025-06-19 DIAGNOSIS — I73.00 RAYNAUD'S PHENOMENON WITHOUT GANGRENE: ICD-10-CM

## 2025-06-19 DIAGNOSIS — R76.8 SS-A ANTIBODY POSITIVE: ICD-10-CM

## 2025-06-19 DIAGNOSIS — E55.9 VITAMIN D DEFICIENCY: ICD-10-CM

## 2025-06-19 RX ORDER — BACLOFEN 10 MG/1
10 TABLET ORAL 3 TIMES DAILY
COMMUNITY

## 2025-06-19 RX ORDER — MILNACIPRAN HYDROCHLORIDE 12.5-25-5
KIT ORAL
Qty: 1 EACH | Refills: 0 | Status: SHIPPED | OUTPATIENT
Start: 2025-06-19

## 2025-06-19 NOTE — PROGRESS NOTES
Office Visit       Date: 06/19/2025   Patient Name: Bev Ferrari  MRN: [id number]  YOB: 1980    Referring Physician: Carlos Arambula MD     Chief Complaint: Joint pain      History of Present Illness: Bev Ferrari is a 44 y.o. female with history of fibromyalgia, Raynaud's, tarsal tunnel who is here today here today in consultation from orthopedics Dr. Arambula for multiple joint pain, history of Sjogren's disease, trigeminal neuralgia    History of Present Illness    She was previously referred to rheumatology Dr. Dent 3/26/2021 due to severe headaches and facial pain on one side, accompanied by an elevated sed rate. A nurse practitioner suspected giant cell arteritis, but Dr. Dent disagreed. Blood tests with Dr. Dent revealed a positive SSA associated with Sjogren's disease, but she reports no dryness in her mouth, nose, or eyes.  Dr. Dent tried her on hydroxychloroquine for possible Sjogren's, which she did not tolerate.  She developed a rash from hydroxychloroquine, which required steroid injections. She believes her Sjogren's syndrome and facial pain/trigeminal neuralgia are related.     She was diagnosed by Dr. Dent with fibromyalgia.  She frequently aches all over in the muscles.  She is fatigued.     She has been experiencing pain in the medial arch area of her foot, which led her to consult a podiatrist. The podiatrist prescribed ankle braces, but she experienced a severe burning sensation when she stood up quickly without them. Physical therapy exacerbated her pain. On 05/01/2025, she underwent tarsal tunnel release surgery on the more painful side. Dr. Arambula orthopedic surgeon noted significant inflammation and suspected Raynaud's phenomenon due to the purplish discoloration of her toes when hanging. She also reports numbness in her toes, more so on the left side. She experiences pain in both legs and has undergone an MRI of her lower  spine, which revealed a bulging disc in the lumbosacral region making contact with the nerves. She has received three injections in her feet and one epidural injection in her spine, none of which provided relief. An MRI of her foot showed no significant injury. She reports no joint swelling but does experience swelling in her ankles and feet, which worsened with an increase in amitriptyline dosage. 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     She reports ongoing depression    She has trigeminal neuralgia and atypical facial pain for which she follows with neurology.    She is on Lyrica, baclofen, amitriptyline, and oxcarbazepine for migraine headaches, trigeminal neuralgia. She follows up with Dr. Garcia and receives Botox injections. She also takes Emgality for migraines. An MRI of her brain in January was normal.    She has tried lamotrigine and duloxetine, both of which were ineffective.  Has also tried physical therapy and hydrocodone  She is currently on pregabalin, amitriptyline, baclofen    Denies malar rash, psoriasis, oral nasal ulcers, pleurisy/pericarditis, renal/hematologic enteritis, clotting disorder, seizure disorder, iritis.  No weight loss fevers or night sweats.      Specialists: Neurology Dr Garcia, neurosurgery Dr. Maria, ENT Dr. Murcia, Pain management Dr Loya, Ortho Dr Arambula    MRI brain 1/19/2025: Normal findings    Notes reviewed from Dr. Dent 3/26/2021-11/11/2022 who suspected Sjogren's disease and fibromyalgia  * 3/12/21: ESR 33 (<20), CRP 10.9 (<8.0), CBC normal   * 3/26/21 FLO + 1:80 speckled, , .  WBC 16.0, Neutrophils 10.6, Absolute Lymph 4.8.    Subjective     Review of Systems: Review of Systems   Constitutional:  Positive for activity change, chills, diaphoresis and fatigue. Negative for fever and unexpected weight loss.   HENT:  Positive  for congestion, dental problem, ear discharge, ear pain, hearing loss, postnasal drip, sinus pressure and tinnitus. Negative for mouth sores and sore throat.    Eyes:  Positive for blurred vision, photophobia and pain. Negative for redness.   Respiratory:  Negative for cough and shortness of breath.    Cardiovascular:  Positive for chest pain and leg swelling.   Gastrointestinal:  Positive for abdominal pain, anal bleeding, constipation, diarrhea, nausea, rectal pain and indigestion. Negative for blood in stool, vomiting and GERD.   Endocrine: Positive for polydipsia. Negative for polyuria.   Genitourinary:  Positive for decreased libido, dyspareunia, pelvic pain, urgency, vaginal discharge and vaginal pain. Negative for dysuria, genital sores and hematuria.   Musculoskeletal:  Positive for arthralgias, back pain, gait problem, myalgias, neck pain and neck stiffness. Negative for joint swelling.   Skin:  Positive for color change. Negative for rash and bruise.   Allergic/Immunologic: Positive for environmental allergies.   Neurological:  Positive for weakness, numbness, headache, memory problem and confusion. Negative for seizures.   Hematological:  Negative for adenopathy. Does not bruise/bleed easily.   Psychiatric/Behavioral:  Positive for agitation, decreased concentration, dysphoric mood, sleep disturbance, depressed mood and stress. The patient is nervous/anxious.         Past Medical History:   Past Medical History:   Diagnosis Date    ADHD (attention deficit hyperactivity disorder)     Anxiety     Arthritis     Bowel trouble     Cervical disc disorder     Chronic mental illness     Chronic pain disorder     Cluster headache     CTS (carpal tunnel syndrome)     Depression     Difficulty walking     Extremity pain     Fibromyalgia, primary     H/O seasonal allergies     Headache     Headache, tension-type     Hearing loss     HPV in female     Joint pain     Low back pain     Lumbosacral disc disease     Neck  pain     Osteoarthritis     Periodic headache syndrome, not intractable 2022    Psychiatric illness     PTSD (post-traumatic stress disorder)     Sjogren's syndrome     Trigeminal neuralgia     Vision loss        Past Surgical History:   Past Surgical History:   Procedure Laterality Date    CERVICAL BIOPSY      D & C FIRST TRIMESTER / TX INCOMPLETE / MISSED / SEPTIC / INDUCED   2000    DENTAL PROCEDURE      LEEP      ROOT CANAL         Family History:   Family History   Problem Relation Age of Onset    Dementia Mother     Cancer Mother         Skin    Skin cancer Mother     Bipolar disorder Mother     Arthritis Mother      problems Mother     Mental illness Mother         Diagnosed with Bipolar Type I w/ almaz.    Parkinsonism Mother     Anxiety disorder Mother     Hearing loss Mother     Vision loss Mother         Hx of cataract extraction.    Diabetes Father         Prediabetes    Hypertension Father     Cancer Father         Prostate    Prostate cancer Father     Heart disease Father         CAD with stent.    High cholesterol Father     Obesity Father     Arthritis Father     Coronary artery disease Father     Hyperlipidemia Father     Hearing loss Father     Vision loss Father         Hx of cataract extraction..    Depression Sister     Multiple sclerosis Sister     Multiple sclerosis Sister     Depression Sister         THC use.    Drug abuse Sister         THC use.    Vision loss Sister         Glasses/contacts.    Epilepsy Brother     Mental retardation Brother     Seizures Brother     Developmental delay Brother     Early death Brother         Aged 41;  from complications w/ pneumonia in .    Birth defects Brother     Developmental Disability Brother     Learning disabilities Brother         Autism and mental retardation.    Breast cancer Maternal Grandmother     Alcohol abuse Maternal Grandfather     Vision loss Brother         Glasses/contacts.    Ovarian cancer Neg Hx         Social History:   Social History     Socioeconomic History    Marital status:    Tobacco Use    Smoking status: Never     Passive exposure: Never    Smokeless tobacco: Never    Tobacco comments:     2nd hand exposure to cigarettes and vaping.   Vaping Use    Vaping status: Never Used   Substance and Sexual Activity    Alcohol use: Not Currently     Comment: Stopped all alcohol when started on [place] meds.    Drug use: Never    Sexual activity: Yes     Partners: Male     Birth control/protection: Condom, Other     Comment: Pull-out method.       Medications:   Current Outpatient Medications:     amitriptyline (ELAVIL) 50 MG tablet, Take 1 tablet by mouth Every Night., Disp: , Rfl:     baclofen (LIORESAL) 10 MG tablet, Take 1 tablet by mouth 3 (Three) Times a Day., Disp: , Rfl:     galcanezumab-gnlm (EMGALITY) 120 MG/ML auto-injector pen, Inject 1 mL under the skin into the appropriate area as directed Every 28 (Twenty-Eight) Days., Disp: 1 mL, Rfl: 11    levocetirizine (XYZAL) 5 MG tablet, Take 1 tablet by mouth Every Evening., Disp: , Rfl:     loratadine (CLARITIN) 10 MG tablet, Take 1 tablet by mouth Daily., Disp: , Rfl:     multivitamin with minerals tablet tablet, , Disp: , Rfl:     OnabotulinumtoxinA 200 units reconstituted solution, . PHYSICIAN TO INJECT 155 UNITS INTRAMUSCULARLY INTO HEAD, NECK AND SHOULDERS EVERY 12 WEEKS Per FDA PROTOCOL, Disp: 1 each, Rfl: 3    OXcarbazepine (TRILEPTAL) 300 MG tablet, Take 3 tablets by mouth 2 (Two) Times a Day., Disp: 540 tablet, Rfl: 3    pregabalin (LYRICA) 200 MG capsule, TAKE 1 CAPSULE BY MOUTH 3 TIMES A DAY, Disp: 270 capsule, Rfl: 1    Ryaltris 665-25 MCG/ACT suspension, SPRAY 1 SPRAY TWICE A DAY BY INTRANASAL ROUTE., Disp: , Rfl:     milnacipran (Savella) 50 MG tablet tablet, Take 1 tablet by mouth 2 (Two) Times a Day., Disp: 60 tablet, Rfl: 5    Milnacipran HCl (Savella Titration Pack) 12.5 & 25 & 50 MG misc, As directed, Disp: 1 each,  "Rfl: 0    Current Facility-Administered Medications:     OnabotulinumtoxinA 155 Units, 155 Units, Intramuscular, Q3 Months, Buck Garcia MD, 155 Units at 05/30/25 1014    Allergies:   Allergies   Allergen Reactions    Azithromycin Itching    Hydrocodone-Acetaminophen Itching and Rash    Hydroxychloroquine Rash    Molds & Smuts Unknown - Low Severity    Pollen Extract Unknown - Low Severity    Terbinafine Unknown - Low Severity         Objective      Vital Signs:   Vitals:    06/19/25 1116   BP: 142/76   BP Location: Left arm   Patient Position: Sitting   Cuff Size: Large Adult   Pulse: (!) 121   Temp: 97.9 °F (36.6 °C)   Weight: 119 kg (263 lb 6.4 oz)   Height: 177.8 cm (70\")   PainSc: 8      Body mass index is 37.79 kg/m².       Physical Exam:  Physical Exam   MUSCULOSKELETAL:   No peripheral synovitis.  No dactylitis.  No pitting of the nails  Widespread tender points are present above and below the waist 18/18 positive  Purplish discoloration of toes bilateral feet with no digital ulcers  Well-healed scar ankle from tarsal tunnel release    Complete joint exam was performed including the MCPs, PIPs, DIPs of the hands, wrists, elbows, shoulders, hips, knees and ankles.  No soft tissue swelling or tenderness is present except as above.    General: Obese.  Cooperative, alert and oriented. Affect is depressed.  Hydration appears normal.   HEENT: Normocephalic and atraumatic. Lids and conjunctiva are normal. Pupils are equal and sclera are clear. Oropharynx is clear   NECK neck is supple without adenopathy, masses or thyromegaly.   CARDIOVASCULAR: Regular rate and rhythm. No murmurs, rubs or gallops   LUNGS: Effort is normal. Lungs are clear bilateral   ABDOMEN: Not examined  EXTREMITIES: Peripheral pulses are intact. No clubbing.   SKIN: No rashes. No subcutaneous nodules. No digital ulcers. No sclerodactyly.   NEUROLOGIC: Gait is normal. Strength testing is normal.  No focal neurologic deficits    Results " "Review:   Labs:    Lab Results   Component Value Date    BUN 19 06/08/2022    CREATININE 0.85 06/08/2022    BCR 22 06/08/2022    K 3.8 06/08/2022    CO2 26 06/08/2022    CALCIUM 9.0 06/08/2022    ALBUMIN 4.1 06/08/2022    BILITOT 0.3 06/08/2022    AST 31 06/08/2022    ALT 42 (H) 06/08/2022     Lab Results   Component Value Date    WBC 14.51 (H) 06/08/2022    HGB 14.5 06/08/2022    HCT 44.4 06/08/2022    MCV 87 06/08/2022     06/08/2022     No results found for: \"SEDRATE\"  No results found for: \"CRP\"  No results found for: \"QUANTIFERO\", \"QUANTITB1\", \"QUANTITB2\", \"QUANTIFERN\", \"QUANTIFERM\", \"QUANTITBGLDP\"  No results found for: \"RF\"  No results found for: \"HEPBSAG\", \"HEPAIGM\", \"HEPBIGMCORE\", \"HEPCVIRUSABY\"        Procedures    Assessment / Plan      1. Fibromyalgia    2. SS-A antibody positive    3. Arthralgia of multiple sites    4. Raynaud's phenomenon without gangrene    5. Trigeminal neuralgia    6. Other fatigue    7. Vitamin D deficiency        Assessment & Plan  - Fibromyalgia.  - Chronic pain  Previously saw Dr. Dent 3/26/2021 through 11/11/2022 who diagnosed fibromyalgia and Sjogren's disease  Specialists: Neurology Dr Garcia, neurosurgery Dr. Maria, ENT Dr. Murcia, Pain management Dr Loya, Ortho Dr Arambula  MRI brain 1/19/2025: Normal findings    Tried: duloxetine, plaquenil(rash), hydrocodone, injections with pain management, tarsal tunnel release,  orofacial pain clinic, physical therapy, NSAIDs, steroids, tizanidine  *Current rx: Savella, pregabalin, baclofen, amitriptyline, Botox injections    She presents with chronic widespread tenderness on exam consistent with fibromyalgia.   The chronic pain is likely due to fibromyalgia, rather than Sjogren's disease or Raynaud's phenomenon.  No evidence of inflammatory arthritis on exam.  Prior rheumatoid markers have been negative    -Recommend addition of Savella titration pack followed by Savella 50 mg twice daily has been prescribed for pain " management. Potential side effects, including nausea, constipation, dryness, and suicidal ideations, were discussed. -Biofeedback was suggested as a potential treatment for chronic pain.    I encouraged regular low-impact exercise up to 30 minutes/day.  If this is unattainable, recommended graded exercise program starting with 5 minutes of dedicated cardiovascular exercise daily, increasing by 1 minute daily until goal of 30 minutes daily is reached.    -Recommend conservative measures to improve sleep  -Consider referral to sleep medicine for [facility] screening-   -Counseled on alternative therapies that can help with chronic pain including massage therapy, yoga, michael chi  Handout provided on fibromyalgia, Savella, Raynaud's, Sjogren's    -Positive SSA, possible Sjogren's disease  * 3/12/21: ESR 33 (<20), CRP 10.9 (<8.0), CBC normal   * 3/26/21 FLO + 1:80 speckled, +, +.  WBC 16.0, Neutrophils 10.6, Absolute Lymph 4.8.  Negative HLA-B27, negative HIV, hepatitis, Lyme, CK, normal complements, negative anticardiolipin, negative SPEP  -MRI brain 1/19/2025: Normal findings  Prior intolerance hydroxychloroquine with Dr. Dent-widespread rash    She has a strongly positive SSA but does not exhibit clinical symptoms of Sjogren's such as significant dryness of the mouth, nose, or eyes.  She reports some numbness and burning in the feet that could be a neuropathy.  Sometimes Sjogren's could cause a neuropathy.  She is on pregabalin.  There is some uncertainty whether she truly has Sjogren's disease, but if she does there is no evidence of endorgan damage to necessitate immunosuppressive therapy.   A minor salivary gland lip biopsy with her ENT, Dr. Murcia, was suggested for further information if she wishes to pursue it, but she declines presently.    -A chest x-ray will be ordered to rule out enlarged lymph nodes or interstitial lung disease associated with connective tissue disease and Sjogren's syndrome.   We discussed potential increased risk of lymphoma associated with Sjogren's  -Blood tests will also be conducted for monitoring as below.     - Raynaud's Phenomenon.  She exhibits symptoms of Raynaud's phenomenon, particularly in her toes.  No digital ulcerations.  Recommend avoidance of cold, smoke, caffeine, stress which can exacerbate Raynaud's  We discussed medications that could be used for Raynaud's, but she does not feel its severe enough to warrant this    - Trigeminal Neuralgia.  She is currently on Lyrica for trigeminal neuralgia managed by her neurologist.    - Depression.  She has a history of depression since age 12.        Orders Placed This Encounter   Procedures    XR Chest 2 View    CBC Auto Differential    Comprehensive Metabolic Panel    FLO by IFA, Reflex 9-biomarkers profile    C-reactive Protein    UA / M With / Rflx Culture([facility] ONLY) - Urine, Clean Catch    Sedimentation Rate    Protein Elec + Interp, Serum    Protein / Creatinine Ratio, Urine - Urine, Clean Catch    Cyclic Citrul Peptide Antibody, IgG / IgA    Rheumatoid Factor    CK    Hepatitis Panel, Acute    C4+C3    Vitamin B12    Vitamin D,25-Hydroxy    TSH     New Medications Ordered This Visit   Medications    Milnacipran HCl (Savella Titration Pack) 12.5 & 25 & 50 MG misc     Sig: As directed     Dispense:  1 each     Refill:  0    milnacipran (Savella) 50 MG tablet tablet     Sig: Take 1 tablet by mouth 2 (Two) Times a Day.     Dispense:  60 tablet     Refill:  5      TIME SPENT: I spent 72 minutes caring for the patient on this date of service.  This time includes time spent by me in the following activities: Preparing for the visit, obtaining records, reviewing/ordering tests and independently reviewing results, performing a medically appropriate history/exam, counseling and educating the patient/family/caregiver, ordering medications, tests, or procedures, and documenting information in the medical record.    Follow Up:    Return in about 6 months (around 12/19/2025).      Patient or patient representative verbalized consent for the use of Ambient Listening during the visit with  Twin Rico MD for chart documentation. 6/19/2025  12:47 EDT        Twin Rico MD  [facility] [place] [facility]

## 2025-06-19 NOTE — TELEPHONE ENCOUNTER
I called Dr. Arambula's office. No answer in med rec.  I will try again later.  Their number is [phone].  I called [facility] and spoke with Marjorie.  She is faxing us things done last year around October.  Their number is [phone]. -FLO Ramos

## 2025-06-19 NOTE — TELEPHONE ENCOUNTER
Request xray mri reports from [facility]  Request notes/xrays imaging reports from [facility] dr whitehead

## 2025-06-19 NOTE — PATIENT INSTRUCTIONS
Fibromyalgia Patient Education Websites.     CDC exercise website: http://www.cdc.gov/arthritis/basics/physical-activity-overview.html has general information and guidelines for arthritis and fibromyalgia patients starting exercise program    [facility] Fibromyalgia Patient education website: [email or web address] designed by the [employer] [facility] research group led by Dr Josue Trejo MD. Has 9 modules that reviews our current understanding of what fibromyalgia is as well as management options emphasizing self management    You Tube video: https://www.[employer].com/watch?v=C_3phB93rvI animated video presenting concept of chronic centralized pain syndromes such as fibromyalgia. This is done in way that would be understandable to patients.     American College of Rheumatology     Fibromyalgia:     Fibromyalgia is a common health problem that causes widespread pain and tenderness (sensitivity to touch). The pain and tenderness tend to come and go, and move about the body. Most often, people with this chronic (long-term) illness are fatigued (very tired) and have sleep problems. It can be hard to diagnose Fibromyalgia.     Fast Facts:   · Fibromyalgia affects two to four percent of people, mostly women.   · Doctors diagnose fibromyalgia based on all the patient’s relevant symptoms (what you feel), no longer just on the number of tender points.   · There is no test to detect this disease, but you may need lab tests or X-rays to ruleout other health problems.   · Though there is no cure, but medications can relieve symptoms.  · Patients also may feel better with proper self-care, such as exercise and getting enough sleep.     What is Fibromyalgia?   Fibromyalgia is a chronic health problem that causes pain all over the body and other symptoms. Other symptoms of fibromyalgia that patients most often have are:   · Tenderness to touch or pressure affecting muscles and sometimes  joints or even the skin   · Severe fatigue   · Sleep problems (waking up unrefreshed)   · Problems with memory or thinking clearly     Some patients also may have:   · Depression or anxiety   · Migraine or tension headaches   · Digestive problems: irritable bowel syndrome (commonly called IBS) or gastroesophageal reflux disease (often referred to as GERD)   · Irritable or overactive bladder   · Pelvic pain   · Temporomandibular disorder--often called TMJ (a set of symptoms including face or jaw pain, jaw clicking and ringing in the ears)   Symptoms of fibromyalgia and its related problems can vary in intensity, and will wax and wane over time. Stress often worsens the symptoms.     What causes Fibromyalgia?   The causes of fibromyalgia are unclear. They may be different in different people. Fibromyalgia may run in families. There likely are certain genes that can make people more prone to getting fibromyalgia and the other health problems that can occur with it. Genes alone, though, do not cause fibromyalgia.   There is most often some triggering factor that sets off fibromyalgia. It may be spine problems, arthritis, injury, or other type of physical stress. Emotional stress also may trigger this illness. The result is a change in the way the body “talks” with the spinal cord and brain. Levels of brain chemicals and proteins may change. For the person with fibromyalgia, it is as though the “volume control” is turned up too high in the brain's pain processing centers.     Who gets Fibromyalgia?  Fibromyalgia is most common in women, though it can occur in men. It most often starts in middle adulthood, but can occur in the teen years and in old age. Younger children can also develop widespread body pain and fatigue.   You are at higher risk for fibromyalgia if you have a rheumatic disease (health problem that affects the joints, muscles, and bones) These include osteoarthritis, lupus, rheumatoid arthritis or  ankylosing spondylitis.     How is fibromyalgia diagnosed?   A doctor will suspect fibromyalgia based on your symptoms. Doctors may require that you have tenderness to pressure or tender points at a specific number of certain spots before saying you have fibromyalgia, but they are not required to make the diagnosis. A physical exam can be helpful to detect tenderness and to exclude other causes of muscle pain.   There are no diagnostic tests (such as X-rays or blood tests) for this problem. Yet, you may need tests to rule out another health problem that can be confused with fibromyalgia.   Because widespread body pain is the main feature of fibromyalgia, health care providers will ask you to describe your pain. This may help tell the difference between fibromyalgia and other diseases with similar symptoms. Other conditions such as hypothyroidism (underactive thyroid gland) and polymyalgia rheumatica sometimes mimic fibromyalgia. Yet, certain blood tests can tell if you have either of these problems. Sometimes, fibromyalgia is confused with rheumatoid arthritis or lupus. But, again, there is a difference in the symptoms, physical findings and blood tests that will help your health care provider detect these health problems. Unlike fibromyalgia, these rheumatic diseases cause inflammation in the joints and tissues.   Criteria Needed for a Fibromyalgia Diagnosis   1. Generalized pain and increased sensitivity (patients can get pain with somebody hugging them or even with their blood pressure being taken at a doctor's visit). Some patients also even mention that certain textures of fabric hurt their body.  · Fatigue   · Waking unrefreshed  · Cognitive (memory or thought) problems   Plus number of other general physical symptoms  2. Symptoms lasting at least three months at a similar level   3. No other health problem that would explain the pain and other symptoms.     How is Fibromyalgia treated?   There is no cure for  fibromyalgia. However, symptoms can be treated with both medication and non-drug treatments. Many times the best outcomes are achieved by using multiple types of treatments.   Medications: The U.[place]. Food and Drug Administration has approved three drugs for the treatment of fibromyalgia. They include two drugs that change some of the brain chemicals (serotonin and norepinephrine) that help control pain levels: duloxetine (Cymbalta) and milnacipran (Savella). Older drugs that affect these same brain chemicals also may be used to treat fibromyalgia. These include amitriptyline (Elavil) and cyclobenzaprine (Flexeril). Other antidepressant drugs can be helpful in some patients. Side effects vary by the drug. Ask your doctor about the risks and benefits of your medicine.   The other drug approved for fibromyalgia is pregabalin (Lyrica). Pregabalin and another drug, gabapentin (Neurontin), work by blocking the over activity of nerve cells involved in pain transmission. These medicines may cause dizziness, sleepiness, swelling and weight gain.   Doctors do not suggest using opioids for treating fibromyalgia. This is not because of fears of dependence. Rather, evidence suggests these drugs are not of great benefit to most people with fibromyalgia. In fact, they may cause greater pain sensitivity or make pain persist.   In some cases, fibromyalgia pain can improve with use of over-the-counter medicines such as acetaminophen (Tylenol) or nonsteroidal anti-inflammatory drugs (commonly called NSAIDs) like ibuprofen (Advil, Motrin) or naproxen (Aleve, Anaprox). Yet, these drugs likely treat the pain triggers rather than the fibromyalgia pain itself. Thus, they are most useful in people who have other causes for pain such as arthritis.  For sleep problems, some of the medicines that treat pain also improve sleep. These include cyclobenzaprine (Flexeril), amitriptyline (Elavil), gabapentin (Neurontin) or pregabalin (Lyrica). It is  not recommended that patients with fibromyalgia take sleeping medicines like zolpidem (Ambien) or benzodiazepine medications.   Other Therapies: People with fibromyalgia should use non-drug treatments as well as any medicines their doctors suggest.    Research shows that the most effective treatment for fibromyalgia is physical exercise.     Physical exercise should be used in addition to any drug treatment. Patients benefit most from aerobic exercises. Other body-based therapies including Rahul Chi and yoga can ease fibromyalgia symptoms.   Cognitive behavioral therapy is a type of therapy focused on understanding how thoughts and behaviors affect pain and other symptoms. CBT and related treatments such as mindfulness can help patients learn symptom reduction skills that lessen pain.   Other complementary and alternative therapies (sometimes called CAM or integrative medicine), such as acupuncture, chiropractic and massage therapy, can be useful to manage fibromyalgia symptoms. Many of these treatments, though, have not been well tested in patients with fibromyalgia.     Living with Fibromyalgia:   Even with the many treatment options, patient self-care is vital to improving symptoms and daily function. In concert with medical treatment, healthy lifestyle behaviors can reduce pain, increase sleep quality, lessen fatigue and help you cope better with fibromyalgia.   Here are some self-care tips for living with fibromyalgia:   · Make time to relax each day. Deep-breathing exercises and meditation will help reduce the stress that can bring on symptoms.   · Set a regular sleep pattern. Go to bed and wake up at the same time each day. Getting enough sleep lets your body repair itself, physically and mentally. Also, avoid daytime napping and limit caffeine intake, which can disrupt sleep. Nicotine is a stimulant, so those fibromyalgia patients with sleep problems should stop smoking.   · Exercise often. This is a very  important part of fibromyalgia treatment. While difficult at first, regular exercise often reduces pain symptoms and fatigue. Patients should follow the saying, “Start low, go slow.” Slowly add daily fitness into your routine. For instance, take the stairs instead of the elevator, or park further away from the store. As your symptoms decrease with drug treatments, start increasing your activity. Add in some walking, swimming, water aerobics and/or stretching exercises, and begin to do things that you stopped doing because of your pain and other symptoms. It takes time to create a comfortable routine. Just get moving, stay active and don't give up!   · Educate yourself. Nationally recognized organizations like the Arthritis Foundation and the National Fibromyalgia Association are great resources for information. Share this information with family, friends and co-workers.    Points to remember:   · Look forward, not backward. Focus on what you need to do to get better, not what caused your illness.   · As your symptoms decrease with drug treatments, start increasing your activity. Begin to fd things that you stopped doing because of your pain and other symptoms.   · With proper treatment and self-care, you can get better and live a normal life.    Complementary and Alternative Therapies for Fibromyalgia    Mind/Body Therapies  - We recommend yoga for many patients with fibromyalgia  o If you have any injuries or physical limitations, your yoga program should be adjusted. Postures can be modified, or you can even do a yoga walk or chair yoga. Please be sure to discuss this with your yoga therapist  - The Chinese practices of qigong and michael chi are also helpful in fibromyalgia   - There is not strong scientific evidence for acupuncture or massage in fibromyalgia. However, many patients with fibromyalgia find one or both of these practices helpful, so it is reasonable to try them.    Nutrition/Supplements  - Many  patients with fibromyalgia try alternative medications, nutritional supplements and/or elimination diets. Unfortunately, none of these approaches has been shown to be effective in fibromyalgia, and there may be risks  - Please speak with your doctor prior to starting any alternative medications, nutritional supplements, or elimination diets.

## 2025-06-20 NOTE — TELEPHONE ENCOUNTER
I called Dr. Ralf Osborne's office.  I was transferred to Med [facility].  I was told to fax request to [phone] Attn: [facility].  I have faxed request to them. -FLO Ramos

## 2025-06-23 ENCOUNTER — PATIENT ROUNDING (BHMG ONLY) (OUTPATIENT)
Age: 45
End: 2025-06-23
Payer: COMMERCIAL

## 2025-06-23 ENCOUNTER — TELEPHONE (OUTPATIENT)
Age: 45
End: 2025-06-23
Payer: COMMERCIAL

## 2025-06-23 NOTE — PROGRESS NOTES
THE FOLLOWING MESSAGE SENT TO THE PATIENT VIA [facility]:    This is Vibha, I am the patient access supervisor for your new provider at [facility].  I am reaching out to welcome you to our practice! We feel that patient feedback is crucial to ensuring that we provide the highest quality of care and would like to take this opportunity to ask a few questions about your recent visit.      Thinking about your recent visit with us, what things went well?    We strive to ensure that we protect your safety and privacy.  Is there anything we could have done to improve this during your visit?    We are always looking for ways to make each patients experience better.  Do you have any suggestions on how we may improve?    Overall, were you satisfied with your first visit to our practice?    Do you have any questions regarding your visit?    Thank you for taking the time to answer these questions today.  Please feel free to contact our office at [phone] with any questions or concerns.    Thank you and have a wonderful day!    PATIENT DID NOT RESPOND

## 2025-06-23 NOTE — TELEPHONE ENCOUNTER
Called pharmacy to clarify savella titration pack rx. Pharmacist states they had to order titration pack and now that they have it on hand they see it is a blister pack with instructions on it. I gave the okay to add the instructions on pack to the rx.

## 2025-06-25 ENCOUNTER — SPECIALTY PHARMACY (OUTPATIENT)
Dept: ONCOLOGY | Facility: HOSPITAL | Age: 45
End: 2025-06-25
Payer: COMMERCIAL

## 2025-06-25 NOTE — PROGRESS NOTES
Specialty Pharmacy Patient Management Program  Refill Outreach     Bev was contacted today regarding refills of their medication(s).    Refill Questions      Flowsheet Row Most Recent Value   Changes to allergies? No   Changes to medications? No   New conditions or infections since last clinic visit No   Unplanned office visit, urgent care, ED, or hospital admission in the last 4 weeks  No   How does patient/caregiver feel medication is working? Good   Financial problems or insurance changes  No   Since the previous refill, were any specialty medication doses or scheduled injections missed or delayed?  No   If yes, please provide the amount N/A   Why were doses missed? N/A   Does this patient require a clinical escalation to a pharmacist? No            Delivery Questions      Flowsheet Row Most Recent Value   Delivery method UPS   Delivery address verified with patient/caregiver? Yes   Delivery address Home   Other address preferred N/A   Number of medications in delivery 1   Medication(s) being filled and delivered Galcanezumab-gnlm (EMGALITY)   Doses left of specialty medications Emgality = 1 injection ( using today 6/25 )   Copay verified? Yes   Copay amount $35   Copay form of payment Credit/debit on file   Delivery Date Selection 06/27/25   Signature Required No   Do you consent to receive electronic handouts?  Yes                 Follow-up: 28 day(s)     Fransisco Castro, Pharmacy Technician  6/25/2025  12:16 EDT

## 2025-07-22 ENCOUNTER — SPECIALTY PHARMACY (OUTPATIENT)
Dept: ONCOLOGY | Facility: HOSPITAL | Age: 45
End: 2025-07-22
Payer: COMMERCIAL

## 2025-07-22 NOTE — PROGRESS NOTES
Specialty Pharmacy Patient Management Program  Refill Outreach     Bev was contacted today regarding refills of their medication(s).    Refill Questions      Flowsheet Row Most Recent Value   Changes to allergies? No   Changes to medications? Yes  [initiated Savella month ago]   New conditions or infections since last clinic visit No   Unplanned office visit, urgent care, ED, or hospital admission in the last 4 weeks  No   How does patient/caregiver feel medication is working? Good   Financial problems or insurance changes  No   Since the previous refill, were any specialty medication doses or scheduled injections missed or delayed?  No   If yes, please provide the amount n/a   Why were doses missed? n/a   Does this patient require a clinical escalation to a pharmacist? Yes  [initiated Savella a month ago]            Delivery Questions      Flowsheet Row Most Recent Value   Delivery method UPS   Delivery address verified with patient/caregiver? Yes   Delivery address Home   Other address preferred n/a   Number of medications in delivery 1   Medication(s) being filled and delivered Galcanezumab-gnlm (EMGALITY)   Doses left of specialty medications Emgaltiy = 0 injections   Copay verified? Yes   Copay amount emgality = $35   Copay form of payment Credit/debit on file   Delivery Date Selection 07/23/25   Signature Required No   Do you consent to receive electronic handouts?  Yes                 Follow-up: 30 day(s)     Fransisco Castro, Pharmacy Technician  7/22/2025  10:50 EDT

## 2025-07-24 ENCOUNTER — PATIENT MESSAGE (OUTPATIENT)
Dept: NEUROLOGY | Facility: CLINIC | Age: 45
End: 2025-07-24
Payer: COMMERCIAL

## 2025-07-24 RX ORDER — BACLOFEN 10 MG/1
10 TABLET ORAL 3 TIMES DAILY
Qty: 270 TABLET | Refills: 3 | Status: SHIPPED | OUTPATIENT
Start: 2025-07-24

## 2025-07-24 NOTE — TELEPHONE ENCOUNTER
Rx Refill Note  Requested Prescriptions     Pending Prescriptions Disp Refills    baclofen (LIORESAL) 10 MG tablet [Pharmacy Med Name: BACLOFEN 10 MG TABLET] 270 tablet      Sig: TAKE 1 TABLET BY MOUTH 3 TIMES A DAY      Last filled:  06/07/24 w/11  Last office visit with prescribing clinician: 6/7/2024      Next office visit with prescribing clinician: 8/29/2025     Veronika Montes MA  07/24/25, 16:22 EDT

## 2025-07-28 ENCOUNTER — SPECIALTY PHARMACY (OUTPATIENT)
Dept: ONCOLOGY | Facility: HOSPITAL | Age: 45
End: 2025-07-28
Payer: COMMERCIAL

## 2025-07-28 NOTE — PROGRESS NOTES
Specialty Pharmacy Patient Management Program  Refill Outreach     Bev was contacted today regarding refills of their medication(s).    Refill Questions      Flowsheet Row Most Recent Value   Changes to allergies? No   Changes to medications? No   New conditions or infections since last clinic visit No   Unplanned office visit, urgent care, ED, or hospital admission in the last 4 weeks  No   How does patient/caregiver feel medication is working? Good   Financial problems or insurance changes  No   Since the previous refill, were any specialty medication doses or scheduled injections missed or delayed?  No   If yes, please provide the amount N/A   Why were doses missed? N/A   Does this patient require a clinical escalation to a pharmacist? No            Delivery Questions      Flowsheet Row Most Recent Value   Delivery method UPS   Delivery address verified with patient/caregiver? Yes   Delivery address [facility]   Other address preferred N/A   Number of medications in delivery 1   Medication(s) being filled and delivered OnabotulinumtoxinA   Doses left of specialty medications N/A   Copay verified? Yes   Copay amount Botox =$0   Copay form of payment No copayment ($0)   Delivery Date Selection 08/01/25   Signature Required No   Do you consent to receive electronic handouts?  Yes                 Follow-up: 90 day(s)     Boris Babin  7/28/2025  12:07 EDT

## 2025-08-14 ENCOUNTER — SPECIALTY PHARMACY (OUTPATIENT)
Facility: HOSPITAL | Age: 45
End: 2025-08-14
Payer: COMMERCIAL

## 2025-08-29 ENCOUNTER — PROCEDURE VISIT (OUTPATIENT)
Facility: HOSPITAL | Age: 45
End: 2025-08-29
Payer: COMMERCIAL

## 2025-08-29 DIAGNOSIS — G43.709 CHRONIC MIGRAINE WITHOUT AURA WITHOUT STATUS MIGRAINOSUS, NOT INTRACTABLE: Primary | Chronic | ICD-10-CM

## 2025-08-29 PROCEDURE — G0463 HOSPITAL OUTPT CLINIC VISIT: HCPCS | Performed by: PSYCHIATRY & NEUROLOGY
